# Patient Record
Sex: FEMALE | Race: OTHER | Employment: UNEMPLOYED | URBAN - METROPOLITAN AREA
[De-identification: names, ages, dates, MRNs, and addresses within clinical notes are randomized per-mention and may not be internally consistent; named-entity substitution may affect disease eponyms.]

---
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
## 2019-01-17 ENCOUNTER — HOSPITAL ENCOUNTER (OUTPATIENT)
Dept: MAMMOGRAPHY | Age: 19
Discharge: HOME | End: 2019-01-17
Attending: NURSE PRACTITIONER

## 2019-01-17 ENCOUNTER — OFFICE VISIT (OUTPATIENT)
Dept: FAMILY PLANNING/WOMEN'S HEALTH CLINIC | Age: 19
End: 2019-01-17

## 2019-01-17 VITALS — DIASTOLIC BLOOD PRESSURE: 62 MMHG | SYSTOLIC BLOOD PRESSURE: 99 MMHG

## 2019-01-17 DIAGNOSIS — N63.20 BREAST MASS, LEFT: ICD-10-CM

## 2019-01-17 DIAGNOSIS — N63.20 BREAST MASS, LEFT: Primary | ICD-10-CM

## 2019-01-17 PROCEDURE — 76642 ULTRASOUND BREAST LIMITED: CPT

## 2019-01-17 NOTE — PROGRESS NOTES
EVERY WOMANS LIFE HISTORY QUESTIONNAIRE       No Yes Comments   Has a doctor ever seen or felt anything wrong with your breast? []                                  [x]                                  Lumps on both   Have you ever had a breast biopsy? [x]                                  []                                          When and where was last mammogram performed? Never had one    Have you ever been told that there was a problem on your mammogram?   No Yes Comments   []                                  []                                  n/a     Do you have breast implants? No Yes Comments   [x]                                  []                                       When was your last Pap test performed? 2016    Have you ever been diagnosed with any type of Cancer   No Yes Comments (type,when,where,type of treatment   [x]                                  []                                          Has a family member been diagnosed with breast or ovarian cancer? No Yes Comments (which family members, and type   [x]                                  []                                         Have you been through menopause? No Yes Date of LMP   [x]                                  []                                       Are you taking hormone replacement therapy (HRT)     No Yes Comments   [x]                                  []                                       How many times have you been pregnant?     0      Number of live births ? 0    Are you experiencing any of the following? No Yes Comments   Nipple Discharge [x]                                  []                                     Breast Lump/Masses []                                  [x]                                     Breast Skin Changes [x]                                  []                                         Any other health problems?  none      List of Pap Providers given to pt?           HISTORY OF PRESENT Deidre Grover is a 22 y.o. female. HPI   18yoF G0 here for EWL. Miss Aleks Cervantes found a left breast lump in 2016 and was told \"it was nothing\" but to monitor it. For the past month the lump has been painful. She used to have lumps in her right breast but they have disappeared. She had her Implanon recently removed, 12/17/2018. LMP 01/07/2019, denies pregnancy. No FHX of breast or ovarian cancer. Review of Systems   Constitutional: Negative for diaphoresis, malaise/fatigue and weight loss. Physical Exam   Constitutional: She is oriented to person, place, and time. She appears well-developed and well-nourished. Pulmonary/Chest: Right breast exhibits no inverted nipple, no mass, no nipple discharge, no skin change and no tenderness. Left breast exhibits mass. Left breast exhibits no inverted nipple, no nipple discharge, no skin change and no tenderness. Breasts are symmetrical.       Lymphadenopathy:     She has no axillary adenopathy. Right: No supraclavicular adenopathy present. Left: No supraclavicular adenopathy present. Neurological: She is alert and oriented to person, place, and time. Skin: Skin is warm and dry. Psychiatric: She has a normal mood and affect. Nursing note and vitals reviewed. ASSESSMENT and PLAN  1. EWL   2. CBE      -left breast mass x 3  3. Diagnostic US today  4.  Possible referral to [facility] d/t increased pain/discomfort

## 2019-01-22 ENCOUNTER — DOCUMENTATION ONLY (OUTPATIENT)
Dept: FAMILY PLANNING/WOMEN'S HEALTH CLINIC | Age: 19
End: 2019-01-22

## 2019-08-19 ENCOUNTER — TELEPHONE (OUTPATIENT)
Dept: FAMILY PLANNING/WOMEN'S HEALTH CLINIC | Age: 19
End: 2019-08-19

## 2019-08-19 NOTE — TELEPHONE ENCOUNTER
Left VM for pt via [employer]  to call our office. She needs to schedule a f/u breast US that was due in July and there was no response to a reminder letter we sent her.

## 2019-08-28 ENCOUNTER — TELEPHONE (OUTPATIENT)
Dept: FAMILY PLANNING/WOMEN'S HEALTH CLINIC | Age: 19
End: 2019-08-28

## 2019-08-28 NOTE — TELEPHONE ENCOUNTER
Finally got hold of patient to remind her she was due for a f/u breast US in July. We will get an appt scheduled for her at [facility] and call her back with date/time.

## 2019-08-29 ENCOUNTER — TELEPHONE (OUTPATIENT)
Dept: FAMILY PLANNING/WOMEN'S HEALTH CLINIC | Age: 19
End: 2019-08-29

## 2019-09-04 ENCOUNTER — HOSPITAL ENCOUNTER (OUTPATIENT)
Dept: MAMMOGRAPHY | Age: 19
Discharge: HOME | End: 2019-09-04
Attending: NURSE PRACTITIONER

## 2019-09-04 DIAGNOSIS — R93.89 ABNORMAL ULTRASOUND: ICD-10-CM

## 2019-09-04 PROCEDURE — 76642 ULTRASOUND BREAST LIMITED: CPT

## 2019-09-04 NOTE — PROGRESS NOTES
I spoke with Nino Lopez at [facility] and relayed breast biopsy recommendation.   She will contact patient about biopsy appointment

## 2019-09-04 NOTE — PROGRESS NOTES
Please refer Ms. Jorge Meza to DR [facility]ESSICA RUI[facility] for biopsy. Re: BI-RADS 4A, left breast mass.

## 2019-10-14 ENCOUNTER — HOSPITAL ENCOUNTER (OUTPATIENT)
Dept: LAB | Age: 19
Discharge: HOME | End: 2019-10-14

## 2019-10-14 ENCOUNTER — OFFICE VISIT (OUTPATIENT)
Dept: SURGERY | Age: 19
End: 2019-10-14

## 2019-10-14 VITALS
BODY MASS INDEX: 20.06 KG/M2 | HEART RATE: 66 BPM | SYSTOLIC BLOOD PRESSURE: 101 MMHG | DIASTOLIC BLOOD PRESSURE: 57 MMHG | WEIGHT: 124.8 LBS | HEIGHT: 66 IN

## 2019-10-14 DIAGNOSIS — N63.20 LEFT BREAST MASS: Primary | ICD-10-CM

## 2019-10-14 DIAGNOSIS — R92.8 ABNORMAL ULTRASOUND OF BREAST: ICD-10-CM

## 2019-10-14 DIAGNOSIS — N63.10 BREAST MASS, RIGHT: ICD-10-CM

## 2019-10-14 NOTE — PATIENT INSTRUCTIONS

## 2019-10-14 NOTE — LETTER
10/29/2019 9:59 AM 
 
Patient:  Conor Puente YOB: 2000 Date of Visit: 10/14/2019 Dear Jenn Cohen NP 
[facility] [address] [facility] [id number] VIA In Basket 
 : Thank you for referring Ms. Conor Puente to me for evaluation/treatment. Below are the relevant portions of my assessment and plan of care. HISTORY OF PRESENT ILLNESS Conor Puente is a 20 y.o. female. HPI NEW patient consult referred by [facility] for LEFT breast mass. She has felt a LEFT breast lump for about a year and feels it may be slightly larger, tender with palpation or when lying on her side. Denies any nipple discharge/inversion or skin changes. OB History Obstetric Comments Menarche 14, LMP 10/12/19, # of children none, age of 1st delivery n/a, Hysterectomy/oophorectomy no/no, Breast bx no, history of breast feeding n/a, BCP no, Hormone therapy no Family History: No family history of breast cancer. MGM  from a gyn cancer, possibly cervical. 
 
 
19 LEFT breast U/S, BIRADS 4aFINDINGS: Left breast ultrasound was performed of the region of palpable 
concern, the 1:00 location, 4 cm from the nipple. There is an oval circumscribed 
parallel hypoechoic mass with internal vascularity, representing a solid mass. It has increased in size since the prior study. It now measures 3.4 x 2.8 x 1.1 
cm, previously 2.5 x 2.4 x 1 cm. 
  
IMPRESSION IMPRESSION: 
  
1. BI-RADS Assessment Category 4A: Suspicious abnormality - Biopsy should be 
performed in the absence of clinical contraindication. The solid left breast 
mass has increased in size. For this reason, ultrasound-guided core needle 
biopsy is recommended. It is still likely to be a fibroadenoma. 
  
The patient has been notified of these results and recommendations.  After 
consultation with the office of the referring physician, we will assist in 
 setting up a biopsy appointment. 1/17/19 LEFT breast U/S BIRADS 3 Review of Systems Constitutional: Positive for weight loss. All other systems reviewed and are negative. Physical Exam  
Constitutional: She is oriented to person, place, and time. She appears well-developed and well-nourished. HENT:  
Head: Normocephalic. Eyes: EOM are normal.  
Neck: Neck supple. No thyromegaly present. Cardiovascular: Intact distal pulses. Pulmonary/Chest: Effort normal. Right breast exhibits mass (11:00 1 x 1 cm mass firm mobile). Right breast exhibits no inverted nipple, no nipple discharge, no skin change and no tenderness. Left breast exhibits mass (1:00 2 x 3 cm mass). Left breast exhibits no inverted nipple, no nipple discharge, no skin change and no tenderness. Breasts are symmetrical.  
 
 
Abdominal: Soft. Musculoskeletal: Normal range of motion. Lymphadenopathy:  
  She has no cervical adenopathy. She has no axillary adenopathy. Neurological: She is alert and oriented to person, place, and time. Skin: Skin is warm and dry. Psychiatric: She has a normal mood and affect. Nursing note and vitals reviewed. BREAST ULTRASOUND Indication: Bilateral breast mass right breast 1:00 left breast 11:00 Technique: The area was scanned using a high-frequency linear-array near-field transducer Findings: Left: 3.4 x 2.8 x 1.1 cm mass regular hypoechoic Right breast 1 x 1 cm mass regular hypoechoic Impression: bilateral masses Disposition: Ultrasound-guided biopsy performed on both US - Guided Core Biopsy Indication : Palpable mass Bilateral  breast Left 1:00 Right 11:00 Ultrasound Findings:  See above Prep : alcohol. Anesthesia : 1% lidocaine with epinephrine, 10 cc. Device : The Gnip marquee device was advanced through the lesion and captured tissue with real-time Ultrasound Confirmation. Core Sampling :  3 cores were obtained from each mass Marker: hydromark Dressing : Steristrips, gauze and tape. Instructions : Remove gauze this evening. Remove steristrips in one week. Pathology : right: fibroadenoma Left: fibroadenoma Concordance:yes ASSESSMENT and PLAN 
  ICD-10-CM ICD-9-CM 1. Left breast mass N63.20 611.72 CANCELED: SURGICAL PATHOLOGY 2. Breast mass, right N63.10 611.72 CANCELED: SURGICAL PATHOLOGY 3. Abnormal ultrasound of breast R92.8 793.89 CANCELED: SURGICAL PATHOLOGY  
 
- bilateral fibroadenomas - f/u 6 months. Patient wants to observe. If you have questions, please do not hesitate to call me. I look forward to following Ms. Eliot Inman along with you. Sincerely, Ivette Rosenthal MD

## 2019-10-14 NOTE — PROGRESS NOTES
HISTORY OF PRESENT ILLNESS  Shan Soto is a 20 y.o. female. HPI NEW patient consult referred by [facility] for LEFT breast mass. She has felt a LEFT breast lump for about a year and feels it may be slightly larger, tender with palpation or when lying on her side. Denies any nipple discharge/inversion or skin changes. OB History   Obstetric Comments   Menarche 14, LMP 10/12/19, # of children none, age of 1st delivery n/a, Hysterectomy/oophorectomy no/no, Breast bx no, history of breast feeding n/a, BCP no, Hormone therapy no       Family History:  No family history of breast cancer. MGM  from a gyn cancer, possibly cervical.      19 LEFT breast U/S, BIRADS 4aFINDINGS: Left breast ultrasound was performed of the region of palpable  concern, the 1:00 location, 4 cm from the nipple. There is an oval circumscribed  parallel hypoechoic mass with internal vascularity, representing a solid mass. It has increased in size since the prior study. It now measures 3.4 x 2.8 x 1.1  cm, previously 2.5 x 2.4 x 1 cm.     IMPRESSION  IMPRESSION:     1. BI-RADS Assessment Category 4A: Suspicious abnormality - Biopsy should be  performed in the absence of clinical contraindication. The solid left breast  mass has increased in size. For this reason, ultrasound-guided core needle  biopsy is recommended. It is still likely to be a fibroadenoma.     The patient has been notified of these results and recommendations. After  consultation with the office of the referring physician, we will assist in  setting up a biopsy appointment. 19 LEFT breast U/S BIRADS 3    Review of Systems   Constitutional: Positive for weight loss. All other systems reviewed and are negative. Physical Exam   Constitutional: She is oriented to person, place, and time. She appears well-developed and well-nourished. HENT:   Head: Normocephalic. Eyes: EOM are normal.   Neck: Neck supple. No thyromegaly present. Cardiovascular: Intact distal pulses. Pulmonary/Chest: Effort normal. Right breast exhibits mass (11:00 1 x 1 cm mass firm mobile). Right breast exhibits no inverted nipple, no nipple discharge, no skin change and no tenderness. Left breast exhibits mass (1:00 2 x 3 cm mass). Left breast exhibits no inverted nipple, no nipple discharge, no skin change and no tenderness. Breasts are symmetrical.       Abdominal: Soft. Musculoskeletal: Normal range of motion. Lymphadenopathy:     She has no cervical adenopathy. She has no axillary adenopathy. Neurological: She is alert and oriented to person, place, and time. Skin: Skin is warm and dry. Psychiatric: She has a normal mood and affect. Nursing note and vitals reviewed. BREAST ULTRASOUND  Indication: Bilateral breast mass right breast 1:00 left breast 11:00   Technique: The area was scanned using a high-frequency linear-array near-field transducer  Findings: Left: 3.4 x 2.8 x 1.1 cm mass regular hypoechoic  Right breast 1 x 1 cm mass regular hypoechoic  Impression: bilateral masses  Disposition: Ultrasound-guided biopsy performed on both    US - Guided Core Biopsy  Indication : Palpable mass Bilateral  breast Left 1:00 Right 11:00    Ultrasound Findings:  See above   Prep : alcohol. Anesthesia : 1% lidocaine with epinephrine, 10 cc. Device : The [employer] Calend[employer] device was advanced through the lesion and captured tissue with real-time Ultrasound Confirmation. Core Sampling :  3 cores were obtained from each mass  Marker: hydromark   Dressing : Steristrips, gauze and tape. Instructions : Remove gauze this evening. Remove steristrips in one week. Pathology : right: fibroadenoma Left: fibroadenoma  Concordance:yes  ASSESSMENT and PLAN    ICD-10-CM ICD-9-CM    1. Left breast mass N63.20 611.72 CANCELED: SURGICAL PATHOLOGY   2. Breast mass, right N63.10 611.72 CANCELED: SURGICAL PATHOLOGY   3.  Abnormal ultrasound of breast R92.8 793.89 CANCELED: SURGICAL PATHOLOGY     - bilateral fibroadenomas  - f/u 6 months. Patient wants to observe.

## 2019-10-29 NOTE — COMMUNICATION BODY
HISTORY OF PRESENT ILLNESS  Robert Montejo is a 20 y.o. female. HPI NEW patient consult referred by [facility] for LEFT breast mass. She has felt a LEFT breast lump for about a year and feels it may be slightly larger, tender with palpation or when lying on her side. Denies any nipple discharge/inversion or skin changes. OB History   Obstetric Comments   Menarche 14, LMP 10/12/19, # of children none, age of 1st delivery n/a, Hysterectomy/oophorectomy no/no, Breast bx no, history of breast feeding n/a, BCP no, Hormone therapy no       Family History:  No family history of breast cancer. MGM  from a gyn cancer, possibly cervical.      19 LEFT breast U/S, BIRADS 4aFINDINGS: Left breast ultrasound was performed of the region of palpable  concern, the 1:00 location, 4 cm from the nipple. There is an oval circumscribed  parallel hypoechoic mass with internal vascularity, representing a solid mass. It has increased in size since the prior study. It now measures 3.4 x 2.8 x 1.1  cm, previously 2.5 x 2.4 x 1 cm.     IMPRESSION  IMPRESSION:     1. BI-RADS Assessment Category 4A: Suspicious abnormality - Biopsy should be  performed in the absence of clinical contraindication. The solid left breast  mass has increased in size. For this reason, ultrasound-guided core needle  biopsy is recommended. It is still likely to be a fibroadenoma.     The patient has been notified of these results and recommendations. After  consultation with the office of the referring physician, we will assist in  setting up a biopsy appointment. 19 LEFT breast U/S BIRADS 3    Review of Systems   Constitutional: Positive for weight loss. All other systems reviewed and are negative. Physical Exam   Constitutional: She is oriented to person, place, and time. She appears well-developed and well-nourished. HENT:   Head: Normocephalic. Eyes: EOM are normal.   Neck: Neck supple. No thyromegaly present. Cardiovascular: Intact distal pulses. Pulmonary/Chest: Effort normal. Right breast exhibits mass (11:00 1 x 1 cm mass firm mobile). Right breast exhibits no inverted nipple, no nipple discharge, no skin change and no tenderness. Left breast exhibits mass (1:00 2 x 3 cm mass). Left breast exhibits no inverted nipple, no nipple discharge, no skin change and no tenderness. Breasts are symmetrical.       Abdominal: Soft. Musculoskeletal: Normal range of motion. Lymphadenopathy:     She has no cervical adenopathy. She has no axillary adenopathy. Neurological: She is alert and oriented to person, place, and time. Skin: Skin is warm and dry. Psychiatric: She has a normal mood and affect. Nursing note and vitals reviewed. BREAST ULTRASOUND  Indication: Bilateral breast mass right breast 1:00 left breast 11:00   Technique: The area was scanned using a high-frequency linear-array near-field transducer  Findings: Left: 3.4 x 2.8 x 1.1 cm mass regular hypoechoic  Right breast 1 x 1 cm mass regular hypoechoic  Impression: bilateral masses  Disposition: Ultrasound-guided biopsy performed on both    US - Guided Core Biopsy  Indication : Palpable mass Bilateral  breast Left 1:00 Right 11:00    Ultrasound Findings:  See above   Prep : alcohol. Anesthesia : 1% lidocaine with epinephrine, 10 cc. Device : The Solid[employer]e device was advanced through the lesion and captured tissue with real-time Ultrasound Confirmation. Core Sampling :  3 cores were obtained from each mass  Marker: hydromark   Dressing : Steristrips, gauze and tape. Instructions : Remove gauze this evening. Remove steristrips in one week. Pathology : right: fibroadenoma Left: fibroadenoma  Concordance:yes  ASSESSMENT and PLAN    ICD-10-CM ICD-9-CM    1. Left breast mass N63.20 611.72 CANCELED: SURGICAL PATHOLOGY   2. Breast mass, right N63.10 611.72 CANCELED: SURGICAL PATHOLOGY   3.  Abnormal ultrasound of breast R92.8 793.89 CANCELED: SURGICAL PATHOLOGY     - bilateral fibroadenomas  - f/u 6 months. Patient wants to observe.

## 2020-07-13 ENCOUNTER — TELEPHONE (OUTPATIENT)
Dept: FAMILY PLANNING/WOMEN'S HEALTH CLINIC | Age: 20
End: 2020-07-13

## 2020-07-13 NOTE — TELEPHONE ENCOUNTER
Attempted to reach pt as she is overdue for a f/u appt with Dr. Rebecca Stephens at the DR [facility]. Phone just rang busy.

## 2020-07-15 ENCOUNTER — TELEPHONE (OUTPATIENT)
Dept: FAMILY PLANNING/WOMEN'S HEALTH CLINIC | Age: 20
End: 2020-07-15

## 2022-03-18 PROBLEM — K35.80 ACUTE APPENDICITIS: Status: ACTIVE | Noted: 2018-06-25

## 2022-03-19 PROBLEM — K62.5 RECTAL BLEEDING: Status: ACTIVE | Noted: 2018-01-09

## 2024-02-20 ENCOUNTER — INITIAL PRENATAL (OUTPATIENT)
Age: 24
End: 2024-02-20

## 2024-02-20 VITALS
SYSTOLIC BLOOD PRESSURE: 108 MMHG | HEIGHT: 66 IN | BODY MASS INDEX: 20.22 KG/M2 | WEIGHT: 125.8 LBS | DIASTOLIC BLOOD PRESSURE: 78 MMHG

## 2024-02-20 DIAGNOSIS — Z3A.01 LESS THAN 8 WEEKS GESTATION OF PREGNANCY: ICD-10-CM

## 2024-02-20 DIAGNOSIS — Z36.9 UNSPECIFIED ANTENATAL SCREENING: Primary | ICD-10-CM

## 2024-02-21 LAB
BACTERIA SPEC CULT: NORMAL
SERVICE CMNT-IMP: NORMAL

## 2024-02-25 LAB
C TRACH RRNA SPEC QL NAA+PROBE: NEGATIVE
N GONORRHOEA RRNA SPEC QL NAA+PROBE: NEGATIVE
SPECIMEN SOURCE: NORMAL
T VAGINALIS RRNA SPEC QL NAA+PROBE: NEGATIVE

## 2024-04-01 ENCOUNTER — ROUTINE PRENATAL (OUTPATIENT)
Age: 24
End: 2024-04-01

## 2024-04-01 VITALS — BODY MASS INDEX: 21.4 KG/M2 | WEIGHT: 132.6 LBS | SYSTOLIC BLOOD PRESSURE: 110 MMHG | DIASTOLIC BLOOD PRESSURE: 60 MMHG

## 2024-04-01 DIAGNOSIS — Z3A.01 LESS THAN 8 WEEKS GESTATION OF PREGNANCY: Primary | ICD-10-CM

## 2024-04-01 LAB
ABO + RH BLD: NORMAL
BLOOD BANK CMNT PATIENT-IMP: NORMAL
BLOOD GROUP ANTIBODIES SERPL: NORMAL
SPECIMEN EXP DATE BLD: NORMAL

## 2024-04-01 PROCEDURE — 0502F SUBSEQUENT PRENATAL CARE: CPT | Performed by: ADVANCED PRACTICE MIDWIFE

## 2024-04-01 RX ORDER — PNV NO.95/FERROUS FUM/FOLIC AC 28MG-0.8MG
1 TABLET ORAL DAILY
Qty: 30 TABLET | Refills: 5 | Status: SHIPPED | OUTPATIENT
Start: 2024-04-01

## 2024-04-02 LAB
ERYTHROCYTE [DISTWIDTH] IN BLOOD BY AUTOMATED COUNT: 12.9 % (ref 11.5–14.5)
HBV SURFACE AG SER QL: <0.1 INDEX
HBV SURFACE AG SER QL: NEGATIVE
HCT VFR BLD AUTO: 39.4 % (ref 35–47)
HCV AB SER IA-ACNC: <0.02 INDEX
HCV AB SERPL QL IA: NONREACTIVE
HGB BLD-MCNC: 13.5 G/DL (ref 11.5–16)
HIV 1+2 AB+HIV1 P24 AG SERPL QL IA: NONREACTIVE
HIV 1/2 RESULT COMMENT: NORMAL
MCH RBC QN AUTO: 31.3 PG (ref 26–34)
MCHC RBC AUTO-ENTMCNC: 34.3 G/DL (ref 30–36.5)
MCV RBC AUTO: 91.4 FL (ref 80–99)
NRBC # BLD: 0 K/UL (ref 0–0.01)
NRBC BLD-RTO: 0 PER 100 WBC
PLATELET # BLD AUTO: 212 K/UL (ref 150–400)
PMV BLD AUTO: 12.3 FL (ref 8.9–12.9)
RBC # BLD AUTO: 4.31 M/UL (ref 3.8–5.2)
RUBV IGG SERPL IA-ACNC: NORMAL IU/ML
T PALLIDUM AB SER QL IA: NON REACTIVE
VZV IGG SER IA-ACNC: 768 INDEX
WBC # BLD AUTO: 8.5 K/UL (ref 3.6–11)

## 2024-04-03 LAB
HGB A MFR BLD: 97.2 % (ref 96.4–98.8)
HGB A2 MFR BLD COLUMN CHROM: 2.8 % (ref 1.8–3.2)
HGB F MFR BLD: 0 % (ref 0–2)
HGB FRACT BLD-IMP: NORMAL
HGB S MFR BLD: 0 %

## 2024-04-30 ENCOUNTER — ROUTINE PRENATAL (OUTPATIENT)
Age: 24
End: 2024-04-30

## 2024-04-30 VITALS — SYSTOLIC BLOOD PRESSURE: 102 MMHG | DIASTOLIC BLOOD PRESSURE: 70 MMHG | BODY MASS INDEX: 22.27 KG/M2 | WEIGHT: 138 LBS

## 2024-04-30 DIAGNOSIS — Z3A.17 17 WEEKS GESTATION OF PREGNANCY: Primary | ICD-10-CM

## 2024-04-30 DIAGNOSIS — Z3A.01 LESS THAN 8 WEEKS GESTATION OF PREGNANCY: ICD-10-CM

## 2024-04-30 PROCEDURE — 0502F SUBSEQUENT PRENATAL CARE: CPT | Performed by: ADVANCED PRACTICE MIDWIFE

## 2024-04-30 NOTE — PROGRESS NOTES
Pt reports feeling flutters of movement. She c/o some cramping- denied spotting. She reports migraines with loss of appetite and vomiting.   Hasn't tried Tylenol yet, bought it but hasn't had a HA since buying it  Also recommend starting Magnesium daily and increasing water intake  AFP today  FAS next visit

## 2024-05-02 LAB
AFP INTERP SERPL-IMP: NORMAL
AFP MOM SERPL: 0.66
AFP SERPL-MCNC: 28.3 NG/ML
AGE AT DELIVERY: 25.2 YR
AGE OF EGG DONOR: NORMAL
AGE OF EGG DONOR: NORMAL
COMMENT: NORMAL
DONOR EGG?: NO
DONOR EGG?: NORMAL
FAMILY HISTORY NTD: NORMAL
FHX NTD (Y OR N): NO
GA METHOD: NORMAL
GA: 17.4 WEEKS
IDDM PATIENT QL: NO
INSULIN DEP. DIABETIC: 17.11
Lab: 138
Lab: NORMAL
Lab: NORMAL
MAT SCN FOR FETAL ABNORMALITIES SERPL: NORMAL
MULTIPLE PREGNANCY: NO
NEURAL TUBE DEFECT RISK FETUS: NORMAL
NUMBER OF FETUSES: NO
OTHER INDICATIONS: NO
OTHER INDICATIONS: NORMAL
PREVIOUSLY ELEVATED AFP (Y OR N): 17
PREVIOUSLY ELEVATED AFP (Y OR N): NO
PRIOR 1ST TRIM TESTING ?: NO
PRIOR 2ND TRIM TESTING ?: NO
PRIOR DS/NTD SCREEN CURRENT PREGNANCY?: NO
PRIOR DS/NTD SCREEN CURRENT PREGNANCY?: NORMAL
PRIOR FIRST TRIMESTER TESTING (Y OR N ): NORMAL
PRIOR PREGNANCY WITH DOWN SYNDROME (Y OR N): 1
PRIOR PREGNANCY WITH DOWN SYNDROME (Y OR N): NO
TYPE OF EGG DONOR: NORMAL
TYPE OF EGG DONOR: NORMAL

## 2024-05-30 ENCOUNTER — ROUTINE PRENATAL (OUTPATIENT)
Age: 24
End: 2024-05-30

## 2024-05-30 VITALS — DIASTOLIC BLOOD PRESSURE: 62 MMHG | SYSTOLIC BLOOD PRESSURE: 102 MMHG | WEIGHT: 150.4 LBS | BODY MASS INDEX: 24.28 KG/M2

## 2024-05-30 DIAGNOSIS — Z3A.01 LESS THAN 8 WEEKS GESTATION OF PREGNANCY: ICD-10-CM

## 2024-05-30 DIAGNOSIS — Z3A.21 21 WEEKS GESTATION OF PREGNANCY: Primary | ICD-10-CM

## 2024-05-30 DIAGNOSIS — M54.50 ACUTE LOW BACK PAIN WITHOUT SCIATICA, UNSPECIFIED BACK PAIN LATERALITY: ICD-10-CM

## 2024-05-30 PROCEDURE — 0502F SUBSEQUENT PRENATAL CARE: CPT

## 2024-05-30 NOTE — PROGRESS NOTES
KIRT at 21w5d.  Doing well. FM+, Denies LOF/VB/cxns.  US:  FETAL SURVEY A SINGLE VIABLE IUP AT 21W5D IS SEEN. FETAL CARDIAC MOTION OBSERVED. FETAL ANATOMY WAS WELL VISUALIZED AND APPEARS WNL. NO ABNORMALITIES WERE SEEN ON TODAYS EXAM. APPROPRIATE GROWTH MEASURED. SIZE = DATES. DESIRE, PLACENTA AND CERVIX APPEAR WITHIN NORMAL LIMITS. GENDER: MALE  Having some back and alignment pain. Chiro referral placed.  RTO 4 wks with gtt.  LORENA Monahan - TRAVIS

## 2024-06-27 ENCOUNTER — ROUTINE PRENATAL (OUTPATIENT)
Age: 24
End: 2024-06-27

## 2024-06-27 ENCOUNTER — NURSE ONLY (OUTPATIENT)
Age: 24
End: 2024-06-27

## 2024-06-27 VITALS — WEIGHT: 163.2 LBS | BODY MASS INDEX: 26.34 KG/M2 | DIASTOLIC BLOOD PRESSURE: 78 MMHG | SYSTOLIC BLOOD PRESSURE: 128 MMHG

## 2024-06-27 DIAGNOSIS — Z36.9 UNSPECIFIED ANTENATAL SCREENING: ICD-10-CM

## 2024-06-27 DIAGNOSIS — Z3A.01 LESS THAN 8 WEEKS GESTATION OF PREGNANCY: ICD-10-CM

## 2024-06-27 DIAGNOSIS — Z36.9 UNSPECIFIED ANTENATAL SCREENING: Primary | ICD-10-CM

## 2024-06-27 LAB
ABO + RH BLD: NORMAL
BLOOD BANK CMNT PATIENT-IMP: NORMAL
BLOOD GROUP ANTIBODIES SERPL: NORMAL
ERYTHROCYTE [DISTWIDTH] IN BLOOD BY AUTOMATED COUNT: 12.9 % (ref 11.5–14.5)
GLUCOSE 1H P 100 G GLC PO SERPL-MCNC: 112 MG/DL (ref 65–140)
HCT VFR BLD AUTO: 37.4 % (ref 35–47)
HGB BLD-MCNC: 12 G/DL (ref 11.5–16)
HIV 1+2 AB+HIV1 P24 AG SERPL QL IA: NONREACTIVE
HIV 1/2 RESULT COMMENT: NORMAL
MCH RBC QN AUTO: 30.5 PG (ref 26–34)
MCHC RBC AUTO-ENTMCNC: 32.1 G/DL (ref 30–36.5)
MCV RBC AUTO: 95.2 FL (ref 80–99)
NRBC # BLD: 0 K/UL (ref 0–0.01)
NRBC BLD-RTO: 0 PER 100 WBC
PLATELET # BLD AUTO: 253 K/UL (ref 150–400)
PMV BLD AUTO: 12 FL (ref 8.9–12.9)
RBC # BLD AUTO: 3.93 M/UL (ref 3.8–5.2)
SPECIMEN EXP DATE BLD: NORMAL
WBC # BLD AUTO: 7.5 K/UL (ref 3.6–11)

## 2024-06-27 PROCEDURE — 0502F SUBSEQUENT PRENATAL CARE: CPT

## 2024-06-27 RX ORDER — ERYTHROMYCIN 5 MG/G
OINTMENT OPHTHALMIC
Qty: 3.5 G | Refills: 0 | Status: SHIPPED | OUTPATIENT
Start: 2024-06-27 | End: 2024-07-07

## 2024-06-27 NOTE — PROGRESS NOTES
KIRT at 25w5d.  Doing well. FM+, denies LOF/VB/cxns.  Gtt, third tri labs today.  Pt having some anxiety about pregnancy and concerns for baby. She declines med management. [facility] info given. Pt going to work on setting up therapy support.  Patient has three styes. Encouraged keeping clean, warm compress, sending eyrthromycin ointment.  RTO 3 weeks.  Rohit Rfuf, LORENA - TRAVIS

## 2024-06-28 LAB — T PALLIDUM AB SER QL IA: NON REACTIVE

## 2024-07-12 ENCOUNTER — ROUTINE PRENATAL (OUTPATIENT)
Age: 24
End: 2024-07-12

## 2024-07-12 VITALS — BODY MASS INDEX: 27.44 KG/M2 | SYSTOLIC BLOOD PRESSURE: 124 MMHG | DIASTOLIC BLOOD PRESSURE: 82 MMHG | WEIGHT: 170 LBS

## 2024-07-12 DIAGNOSIS — Z3A.28 28 WEEKS GESTATION OF PREGNANCY: Primary | ICD-10-CM

## 2024-07-12 DIAGNOSIS — R30.9 URINARY PAIN: ICD-10-CM

## 2024-07-12 PROCEDURE — 0502F SUBSEQUENT PRENATAL CARE: CPT | Performed by: ADVANCED PRACTICE MIDWIFE

## 2024-07-12 NOTE — PROGRESS NOTES
+FM Pt c/o right side pain during urination along with frequent insomnia. + leuks in dip. Urine culture sent.

## 2024-07-12 NOTE — PROGRESS NOTES
KIRT:  Juliana CHU Hancock is a 23 y.o.  at 27w6d  who presents for routine OB appointment      Wt 77.1 kg (170 lb)   LMP 2023 (Approximate)   BMI 27.44 kg/m²   FHTs: 27  FH: 143    ABO: B POSITIVE     RhoGAM: N/A    GTT: 112  Subjective: Doing well, no complaints. Good FM. Denies vaginal bleeding, Leaking of fluid, abnormal cramping.    third trimester precautions given.   labor precautions reviewed.     Reviewed Tdap, Pt declines today.    Patient reports has some right lower side pain with urination, reports it is more when her bladder gets extremely full, but resolved by the time she is finished emptying her bladder.  Long dip positive for leukocytes no blood or nitrates or bacteria seen will send urine for culture  Discussed emptying her bladder more frequently  Patient reports insomnia had been taking melatonin but it makes her very groggy.  We discussed using the tablets of Unisom, or Benadryl to see if she has less grogginess with those  We discussed her overall weight gain of almost 50 pounds now, we discussed healthy eating style as well as portions, including proteins in meals and snacks    Follow up 2 weeks    LORENA Lucas CNM

## 2024-07-13 LAB
BACTERIA SPEC CULT: NORMAL
SERVICE CMNT-IMP: NORMAL

## 2024-07-26 ENCOUNTER — ROUTINE PRENATAL (OUTPATIENT)
Age: 24
End: 2024-07-26

## 2024-07-26 VITALS — BODY MASS INDEX: 27.89 KG/M2 | SYSTOLIC BLOOD PRESSURE: 118 MMHG | WEIGHT: 172.8 LBS | DIASTOLIC BLOOD PRESSURE: 80 MMHG

## 2024-07-26 DIAGNOSIS — Z3A.30 30 WEEKS GESTATION OF PREGNANCY: Primary | ICD-10-CM

## 2024-07-26 NOTE — PROGRESS NOTES
Doing well over all   Has some cold symptoms   Desires TDAP next visit   General malaise - has not taken a covid test, no fever just general cold symptoms  Recommend Vit C, Vit D, and zinc as well as rest   KIRT 2 weeks

## 2024-08-03 ENCOUNTER — TELEPHONE (OUTPATIENT)
Age: 24
End: 2024-08-03

## 2024-08-03 NOTE — TELEPHONE ENCOUNTER
Called pt due to my chart message. Pt noticing changes in fetal movement. Some decrease in intensity of movement though movement still present. Pt feeling baby move while on phone. Encouraged FKC. Of note, pt has anterior placenta. Reviewed that movement is movement and the characteristics of movement will change over time as baby changes. Pt is going to have a snack, some juice and do Fkc today. If she is not feeling 10 movements in 2 hours, she is going to call back and plan to come to ED. Denies LOF/VB/cxns.    Rohit Ruff, LORENA - TRAVIS

## 2024-08-09 ENCOUNTER — ROUTINE PRENATAL (OUTPATIENT)
Age: 24
End: 2024-08-09

## 2024-08-09 VITALS
HEIGHT: 66 IN | SYSTOLIC BLOOD PRESSURE: 128 MMHG | DIASTOLIC BLOOD PRESSURE: 80 MMHG | WEIGHT: 178.8 LBS | BODY MASS INDEX: 28.73 KG/M2

## 2024-08-09 DIAGNOSIS — Z3A.31 31 WEEKS GESTATION OF PREGNANCY: ICD-10-CM

## 2024-08-09 DIAGNOSIS — Z34.93 PRENATAL CARE IN THIRD TRIMESTER: ICD-10-CM

## 2024-08-09 DIAGNOSIS — L29.9 ITCHING: Primary | ICD-10-CM

## 2024-08-09 LAB
ALBUMIN SERPL-MCNC: 2.6 G/DL (ref 3.5–5)
ALBUMIN/GLOB SERPL: 0.7 (ref 1.1–2.2)
ALP SERPL-CCNC: 121 U/L (ref 45–117)
ALT SERPL-CCNC: 24 U/L (ref 12–78)
ANION GAP SERPL CALC-SCNC: 5 MMOL/L (ref 5–15)
AST SERPL-CCNC: 13 U/L (ref 15–37)
BILIRUB SERPL-MCNC: 0.4 MG/DL (ref 0.2–1)
BUN SERPL-MCNC: 8 MG/DL (ref 6–20)
BUN/CREAT SERPL: 16 (ref 12–20)
CALCIUM SERPL-MCNC: 9 MG/DL (ref 8.5–10.1)
CHLORIDE SERPL-SCNC: 107 MMOL/L (ref 97–108)
CO2 SERPL-SCNC: 24 MMOL/L (ref 21–32)
CREAT SERPL-MCNC: 0.5 MG/DL (ref 0.55–1.02)
ERYTHROCYTE [DISTWIDTH] IN BLOOD BY AUTOMATED COUNT: 13.5 % (ref 11.5–14.5)
GLOBULIN SER CALC-MCNC: 3.8 G/DL (ref 2–4)
GLUCOSE SERPL-MCNC: 76 MG/DL (ref 65–100)
HCT VFR BLD AUTO: 39.6 % (ref 35–47)
HGB BLD-MCNC: 12.9 G/DL (ref 11.5–16)
MCH RBC QN AUTO: 30.6 PG (ref 26–34)
MCHC RBC AUTO-ENTMCNC: 32.6 G/DL (ref 30–36.5)
MCV RBC AUTO: 94.1 FL (ref 80–99)
NRBC # BLD: 0 K/UL (ref 0–0.01)
NRBC BLD-RTO: 0 PER 100 WBC
PLATELET # BLD AUTO: 242 K/UL (ref 150–400)
PMV BLD AUTO: 11.7 FL (ref 8.9–12.9)
POTASSIUM SERPL-SCNC: 4.2 MMOL/L (ref 3.5–5.1)
PROT SERPL-MCNC: 6.4 G/DL (ref 6.4–8.2)
RBC # BLD AUTO: 4.21 M/UL (ref 3.8–5.2)
SODIUM SERPL-SCNC: 136 MMOL/L (ref 136–145)
WBC # BLD AUTO: 7.4 K/UL (ref 3.6–11)

## 2024-08-09 SDOH — ECONOMIC STABILITY: HOUSING INSECURITY
IN THE LAST 12 MONTHS, WAS THERE A TIME WHEN YOU DID NOT HAVE A STEADY PLACE TO SLEEP OR SLEPT IN A SHELTER (INCLUDING NOW)?: NO

## 2024-08-09 SDOH — ECONOMIC STABILITY: FOOD INSECURITY: WITHIN THE PAST 12 MONTHS, THE FOOD YOU BOUGHT JUST DIDN'T LAST AND YOU DIDN'T HAVE MONEY TO GET MORE.: NEVER TRUE

## 2024-08-09 SDOH — ECONOMIC STABILITY: FOOD INSECURITY: WITHIN THE PAST 12 MONTHS, YOU WORRIED THAT YOUR FOOD WOULD RUN OUT BEFORE YOU GOT MONEY TO BUY MORE.: NEVER TRUE

## 2024-08-09 SDOH — ECONOMIC STABILITY: INCOME INSECURITY: HOW HARD IS IT FOR YOU TO PAY FOR THE VERY BASICS LIKE FOOD, HOUSING, MEDICAL CARE, AND HEATING?: NOT HARD AT ALL

## 2024-08-09 ASSESSMENT — PATIENT HEALTH QUESTIONNAIRE - PHQ9
SUM OF ALL RESPONSES TO PHQ QUESTIONS 1-9: 1
SUM OF ALL RESPONSES TO PHQ9 QUESTIONS 1 & 2: 1
1. LITTLE INTEREST OR PLEASURE IN DOING THINGS: NOT AT ALL
2. FEELING DOWN, DEPRESSED OR HOPELESS: SEVERAL DAYS
SUM OF ALL RESPONSES TO PHQ QUESTIONS 1-9: 1

## 2024-08-09 NOTE — PROGRESS NOTES
KIRT:  Juliana CHU Hancock is a 24 y.o.  at 31w6d  who presents for routine OB appointment    No chief complaint on file.         LMP 2023 (Approximate)   FHTs: 150s  FH: 33    Tdap reviewed, Pt desires today.    ABO: B POSITIVE     RhoGAM: N/A     Subjective: Doing well, no complaints. Good FM. Denies vaginal bleeding, Leaking of fluid, regular contractions.    Patient reports itching, reports mostly on the tops of her feet legs hands, rarely on the soles and the palms of her hands will get labs today.  Having some right hip pain with radiating down her leg, discussed sciatica, discussed stretching belly band.  We talked about evening primrose while at 36 weeks Red leaf raspberry and dates can start now just 1 cup of red leaf raspberry a day.  Perineal stretching at 36 weeks    Third trimester precautions given.   labor precautions reviewed.     Follow up in 2 weeks.     LORENA Lucas CNM

## 2024-08-11 LAB — BILE AC SERPL-SCNC: 2.1 UMOL/L (ref 0–10)

## 2024-08-22 ENCOUNTER — ROUTINE PRENATAL (OUTPATIENT)
Age: 24
End: 2024-08-22

## 2024-08-22 VITALS — WEIGHT: 185 LBS | SYSTOLIC BLOOD PRESSURE: 128 MMHG | DIASTOLIC BLOOD PRESSURE: 82 MMHG | BODY MASS INDEX: 29.86 KG/M2

## 2024-08-22 DIAGNOSIS — Z3A.01 LESS THAN 8 WEEKS GESTATION OF PREGNANCY: ICD-10-CM

## 2024-08-22 DIAGNOSIS — H00.019 HORDEOLUM EXTERNUM, UNSPECIFIED LATERALITY: Primary | ICD-10-CM

## 2024-08-22 DIAGNOSIS — Z3A.33 33 WEEKS GESTATION OF PREGNANCY: ICD-10-CM

## 2024-08-22 PROCEDURE — 0502F SUBSEQUENT PRENATAL CARE: CPT

## 2024-08-22 RX ORDER — ACETAMINOPHEN 500 MG
500 TABLET ORAL EVERY 6 HOURS PRN
COMMUNITY

## 2024-08-22 RX ORDER — ASCORBIC ACID 500 MG
1000 TABLET ORAL DAILY
COMMUNITY

## 2024-08-22 NOTE — PROGRESS NOTES
Chief Complaint   Patient presents with    Routine Prenatal Visit     Pt has no abnormal bleeding,discharge nor odors        /82 (Site: Left Upper Arm, Position: Sitting, Cuff Size: Medium Adult)   Wt 83.9 kg (185 lb)   LMP 12/30/2023 (Approximate)   BMI 29.86 kg/m²     Pain Scale: 0 - No pain/10  Pain Location:      Current Outpatient Medications on File Prior to Visit   Medication Sig Dispense Refill    MAGNESIUM PO Take by mouth      vitamin C (ASCORBIC ACID) 500 MG tablet Take 2 tablets by mouth daily      acetaminophen (TYLENOL) 500 MG tablet Take 1 tablet by mouth every 6 hours as needed for Pain      Prenatal Vit-Fe Fumarate-FA (PRENATAL VITAMIN) 27-0.8 MG TABS Take 1 each by mouth daily 30 tablet 5     No current facility-administered medications on file prior to visit.       \"Have you been to the ER, urgent care clinic since your last visit?  Hospitalized since your last visit?\"    NO    “Have you seen or consulted any other health care providers outside of [facility] since your last visit?”    NO

## 2024-08-22 NOTE — PROGRESS NOTES
KIRT at 33w5d.  Doing well. FM+, denies LOF/VB/cxns.  Pt's styes have returned.   PCP and opthal referral placed, whomever can see her soon. They are becoming uncomfortable and impacting vision.   RTO 2-3 weeks.  Rohit Ruff, LORENA - TRAVIS

## 2024-09-08 ENCOUNTER — TELEPHONE (OUTPATIENT)
Facility: HOSPITAL | Age: 24
End: 2024-09-08

## 2024-09-09 ENCOUNTER — ROUTINE PRENATAL (OUTPATIENT)
Age: 24
End: 2024-09-09

## 2024-09-09 VITALS
HEIGHT: 66 IN | DIASTOLIC BLOOD PRESSURE: 76 MMHG | RESPIRATION RATE: 20 BRPM | TEMPERATURE: 98.7 F | HEART RATE: 91 BPM | WEIGHT: 190.5 LBS | OXYGEN SATURATION: 97 % | SYSTOLIC BLOOD PRESSURE: 113 MMHG | BODY MASS INDEX: 30.62 KG/M2

## 2024-09-09 DIAGNOSIS — Z36.9 UNSPECIFIED ANTENATAL SCREENING: Primary | ICD-10-CM

## 2024-09-09 DIAGNOSIS — Z3A.01 LESS THAN 8 WEEKS GESTATION OF PREGNANCY: ICD-10-CM

## 2024-09-09 PROCEDURE — 0502F SUBSEQUENT PRENATAL CARE: CPT | Performed by: MIDWIFE

## 2024-09-11 LAB
GP B STREP DNA SPEC QL NAA+PROBE: NEGATIVE
SPECIMEN SOURCE: NORMAL

## 2024-09-18 ENCOUNTER — ROUTINE PRENATAL (OUTPATIENT)
Age: 24
End: 2024-09-18

## 2024-09-18 VITALS
WEIGHT: 196.1 LBS | TEMPERATURE: 98.6 F | HEIGHT: 66 IN | BODY MASS INDEX: 31.52 KG/M2 | OXYGEN SATURATION: 97 % | RESPIRATION RATE: 20 BRPM | SYSTOLIC BLOOD PRESSURE: 109 MMHG | DIASTOLIC BLOOD PRESSURE: 71 MMHG | HEART RATE: 112 BPM

## 2024-09-18 DIAGNOSIS — Z3A.01 LESS THAN 8 WEEKS GESTATION OF PREGNANCY: ICD-10-CM

## 2024-09-18 DIAGNOSIS — Z3A.37 37 WEEKS GESTATION OF PREGNANCY: Primary | ICD-10-CM

## 2024-09-18 PROCEDURE — 0502F SUBSEQUENT PRENATAL CARE: CPT

## 2024-09-24 ENCOUNTER — ROUTINE PRENATAL (OUTPATIENT)
Age: 24
End: 2024-09-24

## 2024-09-24 VITALS
TEMPERATURE: 32.7 F | DIASTOLIC BLOOD PRESSURE: 80 MMHG | BODY MASS INDEX: 31.92 KG/M2 | HEIGHT: 66 IN | HEART RATE: 92 BPM | SYSTOLIC BLOOD PRESSURE: 117 MMHG | OXYGEN SATURATION: 98 % | RESPIRATION RATE: 18 BRPM | WEIGHT: 198.6 LBS

## 2024-09-24 DIAGNOSIS — Z3A.38 38 WEEKS GESTATION OF PREGNANCY: Primary | ICD-10-CM

## 2024-09-24 DIAGNOSIS — Z34.93 PRENATAL CARE IN THIRD TRIMESTER: ICD-10-CM

## 2024-09-24 PROCEDURE — 0502F SUBSEQUENT PRENATAL CARE: CPT | Performed by: ADVANCED PRACTICE MIDWIFE

## 2024-09-27 ENCOUNTER — TELEPHONE (OUTPATIENT)
Age: 24
End: 2024-09-27

## 2024-10-02 ENCOUNTER — ROUTINE PRENATAL (OUTPATIENT)
Age: 24
End: 2024-10-02

## 2024-10-02 VITALS
BODY MASS INDEX: 32.33 KG/M2 | HEIGHT: 66 IN | WEIGHT: 201.2 LBS | OXYGEN SATURATION: 96 % | DIASTOLIC BLOOD PRESSURE: 74 MMHG | SYSTOLIC BLOOD PRESSURE: 120 MMHG | HEART RATE: 104 BPM | RESPIRATION RATE: 20 BRPM | TEMPERATURE: 98.3 F

## 2024-10-02 DIAGNOSIS — Z3A.39 39 WEEKS GESTATION OF PREGNANCY: Primary | ICD-10-CM

## 2024-10-02 PROCEDURE — 0502F SUBSEQUENT PRENATAL CARE: CPT

## 2024-10-02 NOTE — PROGRESS NOTES
KIRT at 39w4d.  Doing well. FM+, denies LOF/VB/cxns.   CE posterior, fetal head well applied. Confirmed by vscan vertex.  Reviewed SOL and warning s/s.  Reviewed fkc.  RTO 1 wk with US.    Rohit Ruff, LORENA - TRAVIS

## 2024-10-09 DIAGNOSIS — Z3A.40 40 WEEKS GESTATION OF PREGNANCY: Primary | ICD-10-CM

## 2024-10-10 ENCOUNTER — HOSPITAL ENCOUNTER (INPATIENT)
Facility: HOSPITAL | Age: 24
LOS: 2 days | Discharge: HOME | End: 2024-10-12
Attending: OBSTETRICS & GYNECOLOGY | Admitting: OBSTETRICS & GYNECOLOGY
Payer: COMMERCIAL

## 2024-10-10 ENCOUNTER — TELEPHONE (OUTPATIENT)
Age: 24
End: 2024-10-10

## 2024-10-10 PROBLEM — O26.899 ABDOMINAL PAIN AFFECTING PREGNANCY: Status: ACTIVE | Noted: 2024-10-10

## 2024-10-10 PROBLEM — R10.9 ABDOMINAL PAIN AFFECTING PREGNANCY: Status: ACTIVE | Noted: 2024-10-10

## 2024-10-10 LAB
ABO + RH BLD: NORMAL
BLOOD GROUP ANTIBODIES SERPL: NORMAL
ERYTHROCYTE [DISTWIDTH] IN BLOOD BY AUTOMATED COUNT: 14.2 % (ref 11.5–14.5)
HCT VFR BLD AUTO: 42.9 % (ref 35–47)
HGB BLD-MCNC: 14.6 G/DL (ref 11.5–16)
MCH RBC QN AUTO: 31 PG (ref 26–34)
MCHC RBC AUTO-ENTMCNC: 34 G/DL (ref 30–36.5)
MCV RBC AUTO: 91.1 FL (ref 80–99)
NRBC # BLD: 0 K/UL (ref 0–0.01)
NRBC BLD-RTO: 0 PER 100 WBC
PLATELET # BLD AUTO: 230 K/UL (ref 150–400)
PMV BLD AUTO: 12.4 FL (ref 8.9–12.9)
RBC # BLD AUTO: 4.71 M/UL (ref 3.8–5.2)
RPR SER QL: NONREACTIVE
SPECIMEN EXP DATE BLD: NORMAL
WBC # BLD AUTO: 11 K/UL (ref 3.6–11)

## 2024-10-10 PROCEDURE — 10D17Z9 MANUAL EXTRACTION OF PRODUCTS OF CONCEPTION, RETAINED, VIA NATURAL OR ARTIFICIAL OPENING: ICD-10-PCS | Performed by: OBSTETRICS & GYNECOLOGY

## 2024-10-10 PROCEDURE — APPNB60 APP NON BILLABLE TIME 46-60 MINS: Performed by: MIDWIFE

## 2024-10-10 PROCEDURE — 6370000000 HC RX 637 (ALT 250 FOR IP): Performed by: MIDWIFE

## 2024-10-10 PROCEDURE — 0KQM0ZZ REPAIR PERINEUM MUSCLE, OPEN APPROACH: ICD-10-PCS | Performed by: OBSTETRICS & GYNECOLOGY

## 2024-10-10 PROCEDURE — 86592 SYPHILIS TEST NON-TREP QUAL: CPT

## 2024-10-10 PROCEDURE — 6370000000 HC RX 637 (ALT 250 FOR IP): Performed by: OBSTETRICS & GYNECOLOGY

## 2024-10-10 PROCEDURE — 4500000002 HC ER NO CHARGE

## 2024-10-10 PROCEDURE — 86901 BLOOD TYPING SEROLOGIC RH(D): CPT

## 2024-10-10 PROCEDURE — 7100000001 HC PACU RECOVERY - ADDTL 15 MIN

## 2024-10-10 PROCEDURE — 59400 OBSTETRICAL CARE: CPT | Performed by: MIDWIFE

## 2024-10-10 PROCEDURE — 99283 EMERGENCY DEPT VISIT LOW MDM: CPT

## 2024-10-10 PROCEDURE — 7210000100 HC LABOR FEE PER 1 HR

## 2024-10-10 PROCEDURE — 36415 COLL VENOUS BLD VENIPUNCTURE: CPT

## 2024-10-10 PROCEDURE — 7100000000 HC PACU RECOVERY - FIRST 15 MIN

## 2024-10-10 PROCEDURE — 1120000000 HC RM PRIVATE OB

## 2024-10-10 PROCEDURE — 86900 BLOOD TYPING SEROLOGIC ABO: CPT

## 2024-10-10 PROCEDURE — 86850 RBC ANTIBODY SCREEN: CPT

## 2024-10-10 PROCEDURE — APPNB30 APP NON BILLABLE TIME 0-30 MINS: Performed by: MIDWIFE

## 2024-10-10 PROCEDURE — 85027 COMPLETE CBC AUTOMATED: CPT

## 2024-10-10 PROCEDURE — 7220000101 HC DELIVERY VAGINAL/SINGLE

## 2024-10-10 PROCEDURE — 6360000002 HC RX W HCPCS: Performed by: MIDWIFE

## 2024-10-10 PROCEDURE — 4A1HXCZ MONITORING OF PRODUCTS OF CONCEPTION, CARDIAC RATE, EXTERNAL APPROACH: ICD-10-PCS | Performed by: OBSTETRICS & GYNECOLOGY

## 2024-10-10 RX ORDER — ONDANSETRON 2 MG/ML
4 INJECTION INTRAMUSCULAR; INTRAVENOUS EVERY 6 HOURS PRN
Status: DISCONTINUED | OUTPATIENT
Start: 2024-10-10 | End: 2024-10-10

## 2024-10-10 RX ORDER — SODIUM CHLORIDE, SODIUM LACTATE, POTASSIUM CHLORIDE, CALCIUM CHLORIDE 600; 310; 30; 20 MG/100ML; MG/100ML; MG/100ML; MG/100ML
INJECTION, SOLUTION INTRAVENOUS CONTINUOUS
Status: DISCONTINUED | OUTPATIENT
Start: 2024-10-10 | End: 2024-10-12 | Stop reason: HOSPADM

## 2024-10-10 RX ORDER — MISOPROSTOL 200 UG/1
400 TABLET ORAL PRN
Status: DISCONTINUED | OUTPATIENT
Start: 2024-10-10 | End: 2024-10-12 | Stop reason: HOSPADM

## 2024-10-10 RX ORDER — SODIUM CHLORIDE 0.9 % (FLUSH) 0.9 %
5-40 SYRINGE (ML) INJECTION EVERY 12 HOURS SCHEDULED
Status: DISCONTINUED | OUTPATIENT
Start: 2024-10-10 | End: 2024-10-12 | Stop reason: HOSPADM

## 2024-10-10 RX ORDER — TERBUTALINE SULFATE 1 MG/ML
0.25 INJECTION, SOLUTION SUBCUTANEOUS
Status: ACTIVE | OUTPATIENT
Start: 2024-10-10 | End: 2024-10-11

## 2024-10-10 RX ORDER — SODIUM CHLORIDE 9 MG/ML
25 INJECTION, SOLUTION INTRAVENOUS PRN
Status: DISCONTINUED | OUTPATIENT
Start: 2024-10-10 | End: 2024-10-12 | Stop reason: HOSPADM

## 2024-10-10 RX ORDER — CARBOPROST TROMETHAMINE 250 UG/ML
250 INJECTION, SOLUTION INTRAMUSCULAR PRN
Status: DISCONTINUED | OUTPATIENT
Start: 2024-10-10 | End: 2024-10-12 | Stop reason: HOSPADM

## 2024-10-10 RX ORDER — HYDROMORPHONE HYDROCHLORIDE 1 MG/ML
1 INJECTION, SOLUTION INTRAMUSCULAR; INTRAVENOUS; SUBCUTANEOUS EVERY 4 HOURS PRN
Status: DISCONTINUED | OUTPATIENT
Start: 2024-10-10 | End: 2024-10-12 | Stop reason: HOSPADM

## 2024-10-10 RX ORDER — ONDANSETRON 2 MG/ML
4 INJECTION INTRAMUSCULAR; INTRAVENOUS EVERY 6 HOURS PRN
Status: DISCONTINUED | OUTPATIENT
Start: 2024-10-10 | End: 2024-10-12 | Stop reason: HOSPADM

## 2024-10-10 RX ORDER — SODIUM CHLORIDE 9 MG/ML
INJECTION, SOLUTION INTRAVENOUS PRN
Status: DISCONTINUED | OUTPATIENT
Start: 2024-10-10 | End: 2024-10-12 | Stop reason: HOSPADM

## 2024-10-10 RX ORDER — SODIUM CHLORIDE 0.9 % (FLUSH) 0.9 %
5-40 SYRINGE (ML) INJECTION PRN
Status: DISCONTINUED | OUTPATIENT
Start: 2024-10-10 | End: 2024-10-12 | Stop reason: HOSPADM

## 2024-10-10 RX ORDER — ACETAMINOPHEN 325 MG/1
650 TABLET ORAL EVERY 4 HOURS PRN
Status: DISCONTINUED | OUTPATIENT
Start: 2024-10-10 | End: 2024-10-12 | Stop reason: HOSPADM

## 2024-10-10 RX ORDER — ACETAMINOPHEN 500 MG
1000 TABLET ORAL EVERY 8 HOURS SCHEDULED
Status: DISCONTINUED | OUTPATIENT
Start: 2024-10-10 | End: 2024-10-12 | Stop reason: HOSPADM

## 2024-10-10 RX ORDER — ONDANSETRON 4 MG/1
4 TABLET, ORALLY DISINTEGRATING ORAL EVERY 6 HOURS PRN
Status: DISCONTINUED | OUTPATIENT
Start: 2024-10-10 | End: 2024-10-10

## 2024-10-10 RX ORDER — IBUPROFEN 400 MG/1
800 TABLET, FILM COATED ORAL EVERY 8 HOURS SCHEDULED
Status: DISCONTINUED | OUTPATIENT
Start: 2024-10-11 | End: 2024-10-10

## 2024-10-10 RX ORDER — LIDOCAINE HYDROCHLORIDE 10 MG/ML
INJECTION, SOLUTION INFILTRATION; PERINEURAL
Status: DISPENSED
Start: 2024-10-10 | End: 2024-10-11

## 2024-10-10 RX ORDER — OXYTOCIN 10 [USP'U]/ML
10 INJECTION, SOLUTION INTRAMUSCULAR; INTRAVENOUS ONCE
Status: COMPLETED | OUTPATIENT
Start: 2024-10-10 | End: 2024-10-10

## 2024-10-10 RX ORDER — METHYLERGONOVINE MALEATE 0.2 MG/ML
200 INJECTION INTRAVENOUS PRN
Status: DISCONTINUED | OUTPATIENT
Start: 2024-10-10 | End: 2024-10-12 | Stop reason: HOSPADM

## 2024-10-10 RX ORDER — MODIFIED LANOLIN
OINTMENT (GRAM) TOPICAL PRN
Status: DISCONTINUED | OUTPATIENT
Start: 2024-10-10 | End: 2024-10-12 | Stop reason: HOSPADM

## 2024-10-10 RX ORDER — DOCUSATE SODIUM 100 MG/1
100 CAPSULE, LIQUID FILLED ORAL 2 TIMES DAILY PRN
Status: DISCONTINUED | OUTPATIENT
Start: 2024-10-10 | End: 2024-10-12 | Stop reason: HOSPADM

## 2024-10-10 RX ORDER — ONDANSETRON 4 MG/1
4 TABLET, ORALLY DISINTEGRATING ORAL EVERY 6 HOURS PRN
Status: DISCONTINUED | OUTPATIENT
Start: 2024-10-10 | End: 2024-10-12 | Stop reason: HOSPADM

## 2024-10-10 RX ORDER — IBUPROFEN 400 MG/1
800 TABLET, FILM COATED ORAL EVERY 8 HOURS
Status: DISCONTINUED | OUTPATIENT
Start: 2024-10-10 | End: 2024-10-12 | Stop reason: HOSPADM

## 2024-10-10 RX ADMIN — OXYTOCIN 10 UNITS: 10 INJECTION INTRAVENOUS at 18:33

## 2024-10-10 RX ADMIN — IBUPROFEN 800 MG: 400 TABLET, FILM COATED ORAL at 18:15

## 2024-10-10 RX ADMIN — ACETAMINOPHEN 1000 MG: 500 TABLET ORAL at 21:24

## 2024-10-10 ASSESSMENT — ENCOUNTER SYMPTOMS
ALLERGIC/IMMUNOLOGIC NEGATIVE: 1
GASTROINTESTINAL NEGATIVE: 1
RESPIRATORY NEGATIVE: 1
EYES NEGATIVE: 1

## 2024-10-10 ASSESSMENT — PAIN SCALES - GENERAL
PAINLEVEL_OUTOF10: 6
PAINLEVEL_OUTOF10: 5

## 2024-10-10 ASSESSMENT — PAIN DESCRIPTION - DESCRIPTORS: DESCRIPTORS: CRAMPING

## 2024-10-10 ASSESSMENT — PAIN DESCRIPTION - ORIENTATION: ORIENTATION: LOWER

## 2024-10-10 ASSESSMENT — PAIN DESCRIPTION - LOCATION
LOCATION: ABDOMEN;VAGINA
LOCATION: ABDOMEN

## 2024-10-10 NOTE — PROGRESS NOTES
Labor Progress Note  Patient seen, fetal heart rate and contraction pattern evaluated, patient examined.  /78   Pulse 87   Temp 98.7 °F (37.1 °C)   Resp 16   LMP 12/30/2023 (Approximate)   SpO2 99%     Physical Exam:  Cervical Exam:  8 cm dilated    100% effaced    0 station    Presenting Part: cephalic  Membranes:  Intact  Uterine Activity: Frequency: Every 2-4 minutes, Duration: 60 seconds, and Intensity: strong  Fetal Heart Rate: Reactive    Assessment/Plan:  Reassuring fetal status    Anticipate 2nd stage soon  Continue labor support

## 2024-10-10 NOTE — TELEPHONE ENCOUNTER
Patient called, name and  verified. Patient is calling c/o contractions that started @ 11:00 pm last night. She reports that are getting intense and are about 10 mins apart at the moment. She denies any LOF, and good FM. She was told by her midwife to call in if they got more intense. She has been placed on the schedule and told to come in. She is grabbing something to eat and will head in.    She has ALSO been informed that if her contractions gets closer to 3-5 mins apart be fore she can get to our office then she should report immediately to L&D. Pt has expressed understanding. She is currently a  and 40w5d. Age is 24.

## 2024-10-10 NOTE — L&D DELIVERY NOTE
#[id number]                Delivery Providers    Delivering clinician: Elaine Bermudez APRN - CNM     Provider Role     Obstetrician    Rhoda Lloyd, RN Primary Nurse    Linda Hardwick RN Primary  Nurse     NICU Nurse     Neonatologist     Anesthesiologist     Nurse Anesthetist     Nurse Practitioner    Elaine Bermudez APRN - CNM Midwife     Nursery Nurse     Respiratory Therapist     Scrub Tech     Assistant Surgeon              [place] Assessment    Living Status: Living  Delivery Location Comment: LDR 7        Skin Color:   Heart Rate:   Reflex Irritability:   Muscle Tone:   Respiratory Effort:   Total:            1 Minute:    1    2    2    2    2    9         5 Minute:    1    2    2    2    2    9                                        Apgars Assigned By: MAXINE HARDWICK RN              Resuscitation    Method: Bulb Suction, Room Air, Stimulation              Measurements               Skin to Skin      Skin to Skin Initiation Date/Time: 10/10/24 15:51:00 EDT     Skin to Skin With: Mother                Became Complete and +1, with strong urge to push. Pushed for about 45 minutes.  Live Male infant. Over intact perineum. head delivered, tight nuchal noted, delivered through.  Infant placed to maternal abdomen. Infant dried and stimulated, spontaneous cry. Cord allowed to cease pulsations, then doubly clamped and cut per fob. 3VC. Cordblood Obtained yes. Placenta and cord, spont [place], Intact. EBL TBD .All counts correct yes. To RR, Stable.    Second degree perineal tear repaired with 3-0vicryl to hemostasis, and approximation in usual fashion.

## 2024-10-10 NOTE — PROGRESS NOTES
1115 SBAR from NAWAF granda from patient arrived from [place].     1130 patient in room with . Nitrous in use. All questions answered at this time.     1215 . Listened before during and after contraction.   1400 patient in birthing tub    1426 patient back in birthing tub    1442 SVE +1. Called Elaine and notified     1429 Elaine, at bedside. SVE 8-9 cm    1500 Patients IV wet and had come out on own. Removed. Elaine okay with no IV access for now.     1508 patient in hands and knees on bed.     1517 elaine at bedside, patient feeling more pressure.     1521 SROM    1551  BOY!    1715 Patient up to bathroom. Voided large amount. Patient fundus firm at U post void. Small amount of bleeding noted.     1830 patient increased amount of bleeding  with fundal check. Elaine notified of bleeding increase. IM pit ordered 10 units.     1845 TRANSFER - OUT REPORT:    Verbal report given to ALMITA Barker RN on Juliana Hancock  being transferred to [facility] for routine progression of patient care       Report consisted of patient's Situation, Background, Assessment and   Recommendations(SBAR).     Information from the following report(s) Intake/Output, MAR, and Recent Results was reviewed with the receiving nurse.           Lines:       Opportunity for questions and clarification was provided.      Patient transported with:  Registered Nurse

## 2024-10-10 NOTE — H&P
History & Physical    Name: Juliana Hancock MRN: [id number]  SSN: [id number]    YOB: 2000  Age: 24 y.o.  Sex: female        Subjective:     Estimated Date of Delivery: 10/5/24  OB History          1    Para        Term                AB        Living             SAB        IAB        Ectopic        Molar        Multiple        Live Births                    Ms. Fausto Hancock is admitted with pregnancy at 40w5d for active labor. Prenatal course was normal. Please see prenatal records for details.    Patient Active Problem List    Diagnosis Date Noted    Abdominal pain affecting pregnancy 10/10/2024    Less than 8 weeks gestation of pregnancy 2024     *EPDS next visit. Having anxiety about pregnancy and baby's well being. Pt's mother had a child which passed away in infancy. Her mother conceived her while actively grieving this child--struggling to cope with this. Feels she's always been surrounded by the energy of that infant loss* [facility] info given* -feels okay, has not looked into [facility] but agrees to do this>>doing better, prepping for delivery.     **Struggling with multiple styes since becoming pregnant. Recommended hot compress and erythromycin eye ointment sent. If not better by next visit with Reina on , send to PCP--she had 3 large ones at her last visit*- Styes better on -LSC--returned and worsening as of , PCP and opthal referral given for assessment on --opthal managing with ointment and drops. Planning to drain postpartum.    -cholestasis workup, CMP unremarkable, bile acids normal-[facility] [place]    Preferred name: Juliana  Partner name: Franklyn    Provider: TRAVIS  Collaborative provider:  Collaborative appointment:  G 1P  EDC by LMP confirmed by 8 week US  Pertinents:    COVID + in pregnancy:  Covid vaccinated: declined  Baby ASA:    Teaching checklist  First Tri handout: bkr  Second Tri Handout:   Third Tri handout:  Stability: Unknown (8/9/2024)    Housing Stability Vital Sign     Unstable Housing in the Last Year: No     Family History   Problem Relation Age of Onset    Cancer Maternal Grandmother         possibly cervical cancer, ?gyn       No Known Allergies  Prior to Admission medications    Medication Sig Start Date End Date Taking? Authorizing Provider   MAGNESIUM PO Take by mouth    Cari Levine MD   vitamin C (ASCORBIC ACID) 500 MG tablet Take 2 tablets by mouth daily    Cari Levine MD   acetaminophen (TYLENOL) 500 MG tablet Take 1 tablet by mouth every 6 hours as needed for Pain    Cari Levine MD   Prenatal Vit-Fe Fumarate-FA (PRENATAL VITAMIN) 27-0.8 MG TABS Take 1 each by mouth daily 4/1/24   Keyana Osborne APRN - TRAVIS        Review of Systems: Pertinent items are noted in HPI.    Objective:     Vitals:  Vitals:    10/10/24 1019 10/10/24 1021 10/10/24 1217   BP: 134/89 134/89 119/78   Pulse: 90 90 87   Resp: 16  16   Temp: 98.7 °F (37.1 °C)  98.7 °F (37.1 °C)   TempSrc: Oral     SpO2: 99%          Physical Exam:  Abdomen: soft, nontender  Fundus: firm and non tender  Cervical Exam: 4 cm dilated    90% effaced    -1 station    Membranes:  Intact  Fetal Heart Rate: Reactive    Prenatal Labs:   No components found for: \"OBEXTABORH\", \"OBEXTABSCRN\", \"OBEXTRUBELLA\", \"OBEXTGRBS\", \"OBEXTHBSAG\", \"OBEXTHIV\", \"OBEXTRPR\", \"OBEXTGONORR\", \"OBEXTCHLAM\"     Assessment/Plan:     G1 @ 40.5  Spontaneous labor  GBS neg  Category 1 fht    Labor support  Intermittent monitoring  Plans to shower now and then would like dilaudid so she can rest  .    Signed By:  LORENA Bolivar CNM     October 10, 2024

## 2024-10-10 NOTE — ED TRIAGE NOTES
1020-arrived from home with complaints of contractions which started last night about 2300 but became more intense about 0500, denies leaking of fluid, reports active fetal movement    1026-notified DR Rush of pt's arrival/complaints    1033-Dr Rush at the bedside, SVE 4cm, plan to admit

## 2024-10-10 NOTE — ED PROVIDER NOTES
History & Physical    Name: Juliana Hancock MRN: [id number]  SSN: [id number]    YOB: 2000  Age: 24 y.o.  Sex: female      Subjective:     Reason for Admission:  Pregnancy and Contractions    History of Present Illness: Juliana Hancock is a 24 y.o.  female with an estimated gestational age of 40w5d with Estimated Date of Delivery: 10/5/24. Patient complains of moderate contractions for 1 days. Pregnancy has been complicated by  None .  Patient denies chest pain, fever, headache , nausea and vomiting, pelvic pressure, right upper quadrant pain  , shortness of breath, swelling, vaginal bleeding , vaginal leaking of fluid , and visual disturbances.    OB History          1    Para        Term                AB        Living             SAB        IAB        Ectopic        Molar        Multiple        Live Births                  Past Medical History:   Diagnosis Date    PUD (peptic ulcer disease)     states intestinal ulcer, not stomach     Past Surgical History:   Procedure Laterality Date    APPENDECTOMY      COLONOSCOPY N/A 1/10/2018    COLONOSCOPY performed by Gena Juan MD at [facility] ENDOSCOPY     Social History     Occupational History    Not on file   Tobacco Use    Smoking status: Former     Passive exposure: Past    Smokeless tobacco: Never    Tobacco comments:     Quit smoking: reports about 1 cigarete per day   Substance and Sexual Activity    Alcohol use: No    Drug use: No     Comment: former MJ    Sexual activity: Yes     Birth control/protection: Condom     Family History   Problem Relation Age of Onset    Cancer Maternal Grandmother         possibly cervical cancer, ?gyn       No Known Allergies  Prior to Admission medications    Medication Sig Start Date End Date Taking? Authorizing Provider   MAGNESIUM PO Take by mouth    Cari Levine MD   vitamin C (ASCORBIC ACID) 500 MG tablet Take 2 tablets by mouth daily    Cari Levine

## 2024-10-10 NOTE — H&P
Willis Rush MD  Physician  Obstetrics     ED Provider Notes     Signed     Date of Service: 10/10/2024 10:28 AM     Signed       Expand All Collapse All    History & Physical     Name: Juliana Hancock MRN: [id number]  SSN: [id number]    YOB: 2000  Age: 24 y.o.  Sex: female       Subjective:      Reason for Admission:  Pregnancy and Contractions     History of Present Illness: Juliana Hancock is a 24 y.o.  female with an estimated gestational age of 40w5d with Estimated Date of Delivery: 10/5/24. Patient complains of moderate contractions for 1 days. Pregnancy has been complicated by  None .  Patient denies chest pain, fever, headache , nausea and vomiting, pelvic pressure, right upper quadrant pain  , shortness of breath, swelling, vaginal bleeding , vaginal leaking of fluid , and visual disturbances.     OB History            1    Para        Term                AB        Living               SAB        IAB        Ectopic        Molar        Multiple        Live Births                    Past Medical History        Past Medical History:   Diagnosis Date    PUD (peptic ulcer disease)       states intestinal ulcer, not stomach         Past Surgical History         Past Surgical History:   Procedure Laterality Date    APPENDECTOMY        COLONOSCOPY N/A 1/10/2018     COLONOSCOPY performed by eGna Juan MD at [facility] ENDOSCOPY         Social History            Occupational History    Not on file   Tobacco Use    Smoking status: Former       Passive exposure: Past    Smokeless tobacco: Never    Tobacco comments:       Quit smoking: reports about 1 cigarete per day   Substance and Sexual Activity    Alcohol use: No    Drug use: No       Comment: former MJ    Sexual activity: Yes       Birth control/protection: Condom      Family History         Family History   Problem Relation Age of Onset    Cancer Maternal Grandmother           possibly cervical cancer,

## 2024-10-10 NOTE — LACTATION NOTE
This note was copied from a baby's chart.  Baby nursing well after delivery, deep latch obtained, mother is comfortable, baby feeding vigorously with rhythmic suck, swallow, breathe pattern, both breasts offered, baby is skin to skin for feeding.   Mother has easily expressed colostrum and was able to latch baby with assistance.

## 2024-10-11 PROCEDURE — 6370000000 HC RX 637 (ALT 250 FOR IP): Performed by: MIDWIFE

## 2024-10-11 PROCEDURE — 6370000000 HC RX 637 (ALT 250 FOR IP): Performed by: OBSTETRICS & GYNECOLOGY

## 2024-10-11 PROCEDURE — 1120000000 HC RM PRIVATE OB

## 2024-10-11 PROCEDURE — APPNB30 APP NON BILLABLE TIME 0-30 MINS: Performed by: MIDWIFE

## 2024-10-11 RX ADMIN — ACETAMINOPHEN 1000 MG: 500 TABLET ORAL at 05:43

## 2024-10-11 RX ADMIN — IBUPROFEN 800 MG: 400 TABLET, FILM COATED ORAL at 02:11

## 2024-10-11 RX ADMIN — DOCUSATE SODIUM 100 MG: 100 CAPSULE, LIQUID FILLED ORAL at 23:22

## 2024-10-11 RX ADMIN — IBUPROFEN 800 MG: 400 TABLET, FILM COATED ORAL at 11:04

## 2024-10-11 RX ADMIN — ACETAMINOPHEN 1000 MG: 500 TABLET ORAL at 15:02

## 2024-10-11 RX ADMIN — IBUPROFEN 800 MG: 400 TABLET, FILM COATED ORAL at 18:39

## 2024-10-11 RX ADMIN — ACETAMINOPHEN 1000 MG: 500 TABLET ORAL at 23:22

## 2024-10-11 RX ADMIN — DOCUSATE SODIUM 100 MG: 100 CAPSULE, LIQUID FILLED ORAL at 11:04

## 2024-10-11 ASSESSMENT — PAIN - FUNCTIONAL ASSESSMENT
PAIN_FUNCTIONAL_ASSESSMENT: ACTIVITIES ARE NOT PREVENTED
PAIN_FUNCTIONAL_ASSESSMENT: ACTIVITIES ARE NOT PREVENTED

## 2024-10-11 ASSESSMENT — PAIN SCALES - GENERAL
PAINLEVEL_OUTOF10: 5
PAINLEVEL_OUTOF10: 4
PAINLEVEL_OUTOF10: 3

## 2024-10-11 ASSESSMENT — PAIN DESCRIPTION - DESCRIPTORS
DESCRIPTORS: SORE
DESCRIPTORS: ACHING
DESCRIPTORS: CRAMPING;SORE
DESCRIPTORS: SORE

## 2024-10-11 ASSESSMENT — PAIN DESCRIPTION - ORIENTATION: ORIENTATION: LOWER

## 2024-10-11 ASSESSMENT — PAIN DESCRIPTION - LOCATION
LOCATION: ABDOMEN;PERINEUM
LOCATION: ABDOMEN
LOCATION: ABDOMEN;PERINEUM
LOCATION: ABDOMEN

## 2024-10-11 NOTE — PROGRESS NOTES
Post-Partum Day Number 1 Progress Note      Patient doing well post-partum without significant complaint.  She is voiding without difficulty, she reports normal lochia. She is ambulatiing without dizziness.  Her pain is well controlled with oral pain medication. She is tolerating general diet.  She is struggling with getting sleep.      Vitals:  Patient Vitals for the past 8 hrs:   BP Temp Temp src Pulse Resp SpO2   10/11/24 0210 95/65 98.1 °F (36.7 °C) Oral 95 16 98 %     Temp (24hrs), Av.5 °F (36.9 °C), Min:98.1 °F (36.7 °C), Max:98.7 °F (37.1 °C)        Exam:  Patient without distress.                          Abdomen soft, nontender, nondistended, normal bowel sounds               Uterus: fundus firm at level of umbilicus, nontender               Lower extremities are negative for cords or tenderness, +1 swelling.    Labs:   Recent Results (from the past 24 hour(s))   RPR    Collection Time: 10/10/24 11:14 AM   Result Value Ref Range    RPR NONREACTIVE NR     CBC    Collection Time: 10/10/24 11:14 AM   Result Value Ref Range    WBC 11.0 3.6 - 11.0 K/uL    RBC 4.71 3.80 - 5.20 M/uL    Hemoglobin 14.6 11.5 - 16.0 g/dL    Hematocrit 42.9 35.0 - 47.0 %    MCV 91.1 80.0 - 99.0 FL    MCH 31.0 26.0 - 34.0 PG    MCHC 34.0 30.0 - 36.5 g/dL    RDW 14.2 11.5 - 14.5 %    Platelets 230 150 - 400 K/uL    MPV 12.4 8.9 - 12.9 FL    Nucleated RBCs 0.0 0  WBC    nRBC 0.00 0.00 - 0.01 K/uL   TYPE AND SCREEN    Collection Time: 10/10/24 11:14 AM   Result Value Ref Range    Crossmatch expiration date 10/13/2024,7662     ABO/Rh B POSITIVE     Antibody Screen NEG        No components found for: \"OBEXTABORH\", \"OBEXTABSCRN\", \"OBEXTRUBELLA\", \"OBEXTGRBS\", \"OBEXTHBSAG\", \"OBEXTHIV\", \"OBEXTRPR\", \"OBEXTGONORR\", \"OBEXTCHLAM\"    Assessment and Plan:   Postpartum Day #1 S/P .  Doing well.   - routine care   - anticipate discharge in AM

## 2024-10-12 VITALS
SYSTOLIC BLOOD PRESSURE: 105 MMHG | OXYGEN SATURATION: 95 % | RESPIRATION RATE: 17 BRPM | TEMPERATURE: 98.6 F | DIASTOLIC BLOOD PRESSURE: 68 MMHG | HEART RATE: 84 BPM

## 2024-10-12 PROBLEM — K35.80 ACUTE APPENDICITIS: Status: RESOLVED | Noted: 2018-06-25 | Resolved: 2024-10-12

## 2024-10-12 PROBLEM — K62.5 RECTAL BLEEDING: Status: RESOLVED | Noted: 2018-01-09 | Resolved: 2024-10-12

## 2024-10-12 PROCEDURE — 6370000000 HC RX 637 (ALT 250 FOR IP): Performed by: OBSTETRICS & GYNECOLOGY

## 2024-10-12 PROCEDURE — APPNB30 APP NON BILLABLE TIME 0-30 MINS: Performed by: ADVANCED PRACTICE MIDWIFE

## 2024-10-12 RX ADMIN — IBUPROFEN 800 MG: 400 TABLET, FILM COATED ORAL at 04:05

## 2024-10-12 ASSESSMENT — PAIN DESCRIPTION - ORIENTATION: ORIENTATION: LEFT;RIGHT;PROXIMAL

## 2024-10-12 ASSESSMENT — PAIN DESCRIPTION - DESCRIPTORS: DESCRIPTORS: ACHING;SORE

## 2024-10-12 ASSESSMENT — PAIN DESCRIPTION - LOCATION: LOCATION: ABDOMEN

## 2024-10-12 ASSESSMENT — PAIN SCALES - GENERAL: PAINLEVEL_OUTOF10: 5

## 2024-10-12 NOTE — DISCHARGE SUMMARY
Obstetrical Discharge Summary     Name: Juliana Hancock MRN: [id number]  SSN: [id number]    YOB: 2000  Age: 24 y.o.  Sex: female      Admit Date: 10/10/2024    Discharge Date: 10/12/2024     Admitting Physician: Willis Rush MD     Attending Physician:  Lucila Katz MD     Discharge Provider:  LORENA Lucas [facility]     Admission Diagnoses: Abdominal pain affecting pregnancy [O26.899, R10.9]    Discharge Diagnoses:   Information for the patient's :  Fransisco Alcala [[id number]]   @[id number]@     Additional Diagnoses:  No components found for: \"OBEXTABORH\", \"OBEXTABSCRN\", \"OBEXTRUBELLA\", \"OBEXTGRBS\"    Hospital Course:   The patient was admitted 10/10/24 with a cervical exam of  / / .    She received antibiotics received during labor?: No   Type of anesthesia received: Nitrous Oxide    24 y.o.  female at 40w5d   Date/Time of Birth: @BABYSUPPRESS(admitdttm)@   Delivery Type: Vaginal, Spontaneous  Delivering clinician: Elaine Bermudez  Fetal weight: 3.745 kg (8 lb 4.1 oz)  APGARs of 9 and 9 at 1 and 5 minutes, respectively.  Complications: None    [unfilled]   The patient's postpartum course was uncomplicated.  On the day of discharge, the patient was ambulating, voiding spontaneously, tolerating oral intake, and her pain was satisfactorily controlled.  Therefore she was deemed stable for discharge.       Disposition at Discharge: Home or self care    Code Status at Discharge: Full Code      Discharged Condition: Stable    Patient Instructions:   Current Discharge Medication List        CONTINUE these medications which have NOT CHANGED    Details   vitamin C (ASCORBIC ACID) 500 MG tablet Take 2 tablets by mouth daily      acetaminophen (TYLENOL) 500 MG tablet Take 1 tablet by mouth every 6 hours as needed for Pain      Prenatal Vit-Fe Fumarate-FA (PRENATAL VITAMIN) 27-0.8 MG TABS Take 1 each by mouth daily  Qty: 30 tablet, Refills: 5           STOP  taking these medications       MAGNESIUM PO Comments:   Reason for Stopping:               Reference my discharge instructions.    No follow-ups on file.     I spent 30 minutes with the patient to perform a final examination, discuss the hospital stay, give instructions for continuing care to all relevant caregivers and prepare discharge records, prescriptions and referral forms.      Signed By:  LORENA Lucas CNM     October 12, 2024, 11:16 AM

## 2024-10-12 NOTE — LACTATION NOTE
This note was copied from a baby's chart.  Baby nursing well and has improved throughout post partum stay, deep latch maintained, mother is comfortable, milk is in transition, baby feeding vigorously with rhythmic suck, swallow, breathe pattern, with audible swallowing, and evident milk transfer, both breasts offered, baby is asleep following feeding. Baby is feeding on demand, voiding and stools present as appropriate since birth. Weight loss:  5.8%    Breasts may become engorged when milk \"comes in\".  How milk is made / normal phases of milk production, supply and demand discussed.  Taught care of engorged breasts - frequent breastfeeding encouraged and breast massage ac. Then nurse the baby (or pump minimally for comfort). Apply cold compresses ac and/or pc x 15 minutes a few times a day for swelling or discomfort if necessary.  May need to do this care for a couple of days.Discussed prevention and treatment of mastitis.

## 2024-10-12 NOTE — PROGRESS NOTES
Postpartum  note  Obstetrics Postpartum Progress Note  10/12/2024    Events  No Events      Subjective  Pain: controlled with current medications  Lochia: Scant  PO's: taking regular diet, tolerating well  Voiding: voiding well  Ambulating: yes  Feeding: breast    Vitals  /68   Pulse 84   Temp 98.6 °F (37 °C) (Oral)   Resp 17   LMP 2023 (Approximate)   SpO2 95%   Breastfeeding Unknown   Temp (24hrs), Av.2 °F (36.8 °C), Min:97.9 °F (36.6 °C), Max:98.6 °F (37 °C)      Physical Exam [unfilled]  General:alert and oriented times 3, patient without distress  Pulm: Lungs clear to Auscultation Bilat, unlabored breathing  Fundus: Firm, Midline at U/-1, appropriately tender.  Perineum:  Well-approximated (10/12/24 0405 : Valerie Alva, RN) (per RN documentation)  Breasts soft, NT  Extremities: Lower extremities are negative cords or tenderness  Edema: trace to 1+ bilateral pedal edema  Skin: warm dry        Data  @LABRCNTIP(wbc:3,hgb:3,hct:3,plt:3,creatinine:3,ast:3,alt:3)@  Immunization History   Administered Date(s) Administered    BCG ([place] BCG) 1999    DTaP vaccine 1999, 1999, 1999, 2001, 2007    HPV (Human Papilloma Virus)Vaccine 2010, 2012    HPV Quadrivalent (Gardasil) 2014    Hepatitis A Vaccine 2007, 2009    Hepatitis B vaccine 2007, 2009, 2009    MMR, PRIORIX, M-M-R II, (age 12m+), SC, 0.5mL 2007, 2009    Measles 2000, 2001    Meningococcal ACWY Vaccine 2010    Pertussis Vaccine 2009    Polio Virus Vaccine 1999, 1999, 1999, 1999, 2001, 2007    TDaP, ADACEL (age 10y-64y), BOOSTRIX (age 10y+), IM, 0.5mL 2009, 2024    Varicella, VARIVAX, (age 12m+), SC, 0.5mL 2007, 2009     Lab Results   Component Value Date    ABORH B POSITIVE 10/10/2024     @BABYSUPPRESS(resufast,neorh)@       Problem-based Assessment

## 2024-10-12 NOTE — DISCHARGE INSTRUCTIONS
Postpartum Support Groups   We know that all of us are dealing with a tremendous amount of uncertainty, confusion and disruption to our daily lives, which may result in increased anxiety, depression and fear. If you are feeling unsettled or worse, please know that we are here to help. During this time of increased caution and care for one another, [facility] [place] ([facility]) is offering virtual and in person support groups to ALL MOTHERS in [place] regardless of the age of your child/children as a way to help weather this emotional storm together. Social support is an important part of self-care during this time of physical distancing.  Virtual postpartum support group meetings available at www.postpartumva.org  Warm Line: [phone]    Breastfeeding Support Groups   1st and 3rd Wednesday of each month at [facility]  2nd and 4th Tuesday of each month at [facility] (in education center behind cafeteria)    [email or web address]      POST DELIVERY DISCHARGE INSTRUCTIONS      Name: Juliana Y Fausto Santi  YOB: 2000  Primary Diagnosis: [unfilled]    General:     Diet/Diet Restrictions:  Eight 8-ounce glasses of fluid daily (water, juices); avoid excessive caffeine intake.  Meals/snacks as desired which are high in fiber and carbohydrates and low in fat and cholesterol.    Medications:   {Medication reconciliation information is now added to the patient's AVS automatically when it is printed.  There is no need to use this SmartLink in discharge instructions.  Highlight this text and delete it to clear this message}      Physical Activity / Restrictions / Safety:     For vaginal deliveries, be up and moving around but remember to rest! Your body grew and birthed a small human! Be kind to yourself and allow your body to heal. You may gradually resume your normal activities as soon as you feel up to it. You may begin walking and strolling with

## 2024-11-21 ENCOUNTER — POSTPARTUM VISIT (OUTPATIENT)
Age: 24
End: 2024-11-21

## 2024-11-21 VITALS
OXYGEN SATURATION: 95 % | TEMPERATURE: 98.7 F | RESPIRATION RATE: 16 BRPM | BODY MASS INDEX: 29.32 KG/M2 | DIASTOLIC BLOOD PRESSURE: 76 MMHG | HEIGHT: 66 IN | WEIGHT: 182.4 LBS | SYSTOLIC BLOOD PRESSURE: 114 MMHG | HEART RATE: 76 BPM

## 2024-11-21 PROCEDURE — 0503F POSTPARTUM CARE VISIT: CPT

## 2024-11-21 NOTE — PROGRESS NOTES
Postpartum note    Patient here for postpartum visit.  PHYSICAL EXAMINATION    Constitutional  Appearance: well-nourished, well developed, alert, in no acute distress    HENT  Head and Face: appears normal    Gastrointestinal  Abdominal Examination: abdomen non-tender to palpation, normal bowel sounds, no masses present  Liver and spleen: no hepatomegaly present, spleen not palpable  Hernias: no hernias identified    Genitourinary  External Genitalia: normal appearance for age, no discharge present, no tenderness present, no inflammatory lesions present, no masses present, no atrophy present  Vagina: normal vaginal vault without central or paravaginal defects, no discharge present, no inflammatory lesions present, no masses present  Bladder: non-tender to palpation  Urethra: appears normal  Cervix: normal   Uterus: normal size, shape and consistency  Adnexa: no adnexal tenderness present, no adnexal masses present  Perineum: perineum within normal limits, no evidence of trauma, no rashes or skin lesions present  Anus: anus within normal limits, no hemorrhoids present  Inguinal Lymph Nodes: no lymphadenopathy present    Skin  General Inspection: no rash, no lesions identified    Neurologic/Psychiatric  Mental Status:  Orientation: grossly oriented to person, place and time  Mood and Affect: mood normal, affect appropriate    Assessment:  Normal postpartum check    Plan:  Patient declines presence of chaperone during today's visit.   RTO for AE.  EPDS 6--feeling she is doing well overall  Rx for contraception: RTO for nexplanon. Aware to abstain or use condoms for at least 10 days to 2 weeks prior to insertion    LORENA Monahan - TRAVIS

## 2024-11-21 NOTE — PROGRESS NOTES
Juliana Hancock is a 24 y.o. female returns for a routine post-partum follow-up visit     Chief Complaint   Patient presents with    Postpartum Care       Postpartum Depression: Low Risk  (10/12/2024)    [place]  Depression Scale     Last EPDS Total Score: 4     Last EPDS Self Harm Result: Never         Type of delivery: normal spontaneous vaginal delivery  Date of Delivery: October 10, 2024  Breastfeeding: yes  Bleeding Resolved: yes  Birth Control: none.  Last Pap: Unsure        Problems: no problems    1. Have you been to the ER, urgent care clinic, or hospitalized since your last visit? Yes [facility]    2. Have you seen or consulted any other health care providers outside of the [facility] since your last visit? Yes    Examination chaperoned by Cris Agarwal LPN.

## 2024-11-21 NOTE — PERIOP NOTE
[facility]  Ambulatory Surgery Unit  Pre-operative Instructions    Surgery/Procedure Date  Monday, December 2, 2024            Tentative Arrival Time TBD      1. On the day of your surgery/procedure, please report to the Ambulatory Surgery Unit Registration Desk and sign in at your designated time. The Ambulatory Surgery Unit is located in [facility] on the [facility] across from the [facility]. Please have all of your health insurance cards, co-payment, and a photo ID.    **TWO adults may accompany you the day of the procedure.  We have limited seating available.      2. You cannot be dropped off for surgery.  Please make arrangements for a responsible adult friend or family member to remain on the hospital campus during your procedure, and drive you home, as you should not drive for 24 hours following anesthesia. Make arrangements for a responsible adult to stay with you for at least the first 24 hours after your surgery.    3. Do not have anything to eat or drink (including water, gum, mints, coffee, juice) after 11:59 PM, Sunday. This may not apply to medications prescribed by your physician.  (Please note below the special instructions with medications to take the morning of surgery, if applicable.)    4. We recommend you do not drink any alcoholic beverages for 24 hours before and after your surgery.    5. Contact your surgeon’s office for instructions on the following medications: non-steroidal anti-inflammatory drugs (i.e. Advil, Aleve), vitamins, and supplements. (Some surgeon’s will want you to stop these medications prior to surgery and others may allow you to take them)   **If you are currently taking Plavix, Coumadin, Aspirin and/or other blood-thinning agents, contact your surgeon for instructions.** Your surgeon will partner with the physician prescribing these medications to determine if it is safe to stop or if you need to continue taking.

## 2024-11-27 ENCOUNTER — ANESTHESIA EVENT (OUTPATIENT)
Facility: HOSPITAL | Age: 24
End: 2024-11-27
Payer: COMMERCIAL

## 2024-12-02 ENCOUNTER — ANESTHESIA (OUTPATIENT)
Facility: HOSPITAL | Age: 24
End: 2024-12-02
Payer: COMMERCIAL

## 2024-12-02 ENCOUNTER — HOSPITAL ENCOUNTER (OUTPATIENT)
Facility: HOSPITAL | Age: 24
Setting detail: OUTPATIENT SURGERY
Discharge: HOME | End: 2024-12-02
Attending: SPECIALIST | Admitting: SPECIALIST
Payer: COMMERCIAL

## 2024-12-02 VITALS
BODY MASS INDEX: 29.73 KG/M2 | SYSTOLIC BLOOD PRESSURE: 114 MMHG | DIASTOLIC BLOOD PRESSURE: 80 MMHG | HEART RATE: 65 BPM | HEIGHT: 66 IN | OXYGEN SATURATION: 100 % | WEIGHT: 185 LBS | RESPIRATION RATE: 11 BRPM | TEMPERATURE: 97.2 F

## 2024-12-02 LAB — HCG UR QL: NEGATIVE

## 2024-12-02 PROCEDURE — 2709999900 HC NON-CHARGEABLE SUPPLY: Performed by: SPECIALIST

## 2024-12-02 PROCEDURE — 3600000012 HC SURGERY LEVEL 2 ADDTL 15MIN: Performed by: SPECIALIST

## 2024-12-02 PROCEDURE — 6360000002 HC RX W HCPCS: Performed by: SPECIALIST

## 2024-12-02 PROCEDURE — 6360000002 HC RX W HCPCS: Performed by: NURSE ANESTHETIST, CERTIFIED REGISTERED

## 2024-12-02 PROCEDURE — 81025 URINE PREGNANCY TEST: CPT

## 2024-12-02 PROCEDURE — 3600000002 HC SURGERY LEVEL 2 BASE: Performed by: SPECIALIST

## 2024-12-02 PROCEDURE — 7100000010 HC PHASE II RECOVERY - FIRST 15 MIN: Performed by: SPECIALIST

## 2024-12-02 PROCEDURE — 7100000000 HC PACU RECOVERY - FIRST 15 MIN: Performed by: SPECIALIST

## 2024-12-02 PROCEDURE — 6360000002 HC RX W HCPCS: Performed by: ANESTHESIOLOGY

## 2024-12-02 PROCEDURE — 7100000001 HC PACU RECOVERY - ADDTL 15 MIN: Performed by: SPECIALIST

## 2024-12-02 PROCEDURE — 3700000001 HC ADD 15 MINUTES (ANESTHESIA): Performed by: SPECIALIST

## 2024-12-02 PROCEDURE — 3700000000 HC ANESTHESIA ATTENDED CARE: Performed by: SPECIALIST

## 2024-12-02 PROCEDURE — 6370000000 HC RX 637 (ALT 250 FOR IP): Performed by: SPECIALIST

## 2024-12-02 PROCEDURE — 2580000003 HC RX 258: Performed by: ANESTHESIOLOGY

## 2024-12-02 PROCEDURE — 6370000000 HC RX 637 (ALT 250 FOR IP)

## 2024-12-02 PROCEDURE — 6360000002 HC RX W HCPCS

## 2024-12-02 RX ORDER — FENTANYL CITRATE 50 UG/ML
INJECTION, SOLUTION INTRAMUSCULAR; INTRAVENOUS
Status: DISCONTINUED | OUTPATIENT
Start: 2024-12-02 | End: 2024-12-02 | Stop reason: SDUPTHER

## 2024-12-02 RX ORDER — SODIUM CHLORIDE 0.9 % (FLUSH) 0.9 %
5-40 SYRINGE (ML) INJECTION PRN
Status: DISCONTINUED | OUTPATIENT
Start: 2024-12-02 | End: 2024-12-02 | Stop reason: HOSPADM

## 2024-12-02 RX ORDER — FENTANYL CITRATE 50 UG/ML
INJECTION, SOLUTION INTRAMUSCULAR; INTRAVENOUS
Status: COMPLETED
Start: 2024-12-02 | End: 2024-12-02

## 2024-12-02 RX ORDER — KETOROLAC TROMETHAMINE 30 MG/ML
INJECTION, SOLUTION INTRAMUSCULAR; INTRAVENOUS
Status: DISCONTINUED | OUTPATIENT
Start: 2024-12-02 | End: 2024-12-02 | Stop reason: SDUPTHER

## 2024-12-02 RX ORDER — POVIDONE-IODINE 5 %
SOLUTION, NON-ORAL OPHTHALMIC (EYE) PRN
Status: DISCONTINUED | OUTPATIENT
Start: 2024-12-02 | End: 2024-12-02 | Stop reason: ALTCHOICE

## 2024-12-02 RX ORDER — DEXAMETHASONE SODIUM PHOSPHATE 4 MG/ML
INJECTION, SOLUTION INTRA-ARTICULAR; INTRALESIONAL; INTRAMUSCULAR; INTRAVENOUS; SOFT TISSUE
Status: DISCONTINUED | OUTPATIENT
Start: 2024-12-02 | End: 2024-12-02 | Stop reason: SDUPTHER

## 2024-12-02 RX ORDER — DEXAMETHASONE SODIUM PHOSPHATE 4 MG/ML
INJECTION, SOLUTION INTRA-ARTICULAR; INTRALESIONAL; INTRAMUSCULAR; INTRAVENOUS; SOFT TISSUE PRN
Status: DISCONTINUED | OUTPATIENT
Start: 2024-12-02 | End: 2024-12-02 | Stop reason: ALTCHOICE

## 2024-12-02 RX ORDER — KETOROLAC TROMETHAMINE 30 MG/ML
30 INJECTION, SOLUTION INTRAMUSCULAR; INTRAVENOUS
Status: DISCONTINUED | OUTPATIENT
Start: 2024-12-02 | End: 2024-12-02 | Stop reason: HOSPADM

## 2024-12-02 RX ORDER — NALOXONE HYDROCHLORIDE 0.4 MG/ML
INJECTION, SOLUTION INTRAMUSCULAR; INTRAVENOUS; SUBCUTANEOUS PRN
Status: DISCONTINUED | OUTPATIENT
Start: 2024-12-02 | End: 2024-12-02 | Stop reason: HOSPADM

## 2024-12-02 RX ORDER — NEOMYCIN SULFATE, POLYMYXIN B SULFATE, AND DEXAMETHASONE 3.5; 10000; 1 MG/G; [USP'U]/G; MG/G
OINTMENT OPHTHALMIC PRN
Status: DISCONTINUED | OUTPATIENT
Start: 2024-12-02 | End: 2024-12-02 | Stop reason: ALTCHOICE

## 2024-12-02 RX ORDER — MEPERIDINE HYDROCHLORIDE 25 MG/ML
12.5 INJECTION INTRAMUSCULAR; INTRAVENOUS; SUBCUTANEOUS EVERY 5 MIN PRN
Status: DISCONTINUED | OUTPATIENT
Start: 2024-12-02 | End: 2024-12-02 | Stop reason: HOSPADM

## 2024-12-02 RX ORDER — SODIUM CHLORIDE 9 MG/ML
INJECTION, SOLUTION INTRAVENOUS PRN
Status: DISCONTINUED | OUTPATIENT
Start: 2024-12-02 | End: 2024-12-02 | Stop reason: HOSPADM

## 2024-12-02 RX ORDER — OXYCODONE HYDROCHLORIDE 5 MG/1
10 TABLET ORAL PRN
Status: DISCONTINUED | OUTPATIENT
Start: 2024-12-02 | End: 2024-12-02 | Stop reason: HOSPADM

## 2024-12-02 RX ORDER — OXYCODONE HYDROCHLORIDE 5 MG/1
5 TABLET ORAL PRN
Status: DISCONTINUED | OUTPATIENT
Start: 2024-12-02 | End: 2024-12-02 | Stop reason: HOSPADM

## 2024-12-02 RX ORDER — FENTANYL CITRATE 50 UG/ML
25 INJECTION, SOLUTION INTRAMUSCULAR; INTRAVENOUS EVERY 5 MIN PRN
Status: COMPLETED | OUTPATIENT
Start: 2024-12-02 | End: 2024-12-02

## 2024-12-02 RX ORDER — IBUPROFEN 200 MG
400 TABLET ORAL EVERY 6 HOURS PRN
COMMUNITY

## 2024-12-02 RX ORDER — PHENYLEPHRINE HCL IN 0.9% NACL 0.4MG/10ML
SYRINGE (ML) INTRAVENOUS
Status: DISCONTINUED | OUTPATIENT
Start: 2024-12-02 | End: 2024-12-02 | Stop reason: SDUPTHER

## 2024-12-02 RX ORDER — LIDOCAINE HYDROCHLORIDE 10 MG/ML
1 INJECTION, SOLUTION EPIDURAL; INFILTRATION; INTRACAUDAL; PERINEURAL
Status: DISCONTINUED | OUTPATIENT
Start: 2024-12-02 | End: 2024-12-02 | Stop reason: HOSPADM

## 2024-12-02 RX ORDER — SODIUM CHLORIDE 0.9 % (FLUSH) 0.9 %
5-40 SYRINGE (ML) INJECTION EVERY 12 HOURS SCHEDULED
Status: DISCONTINUED | OUTPATIENT
Start: 2024-12-02 | End: 2024-12-02 | Stop reason: HOSPADM

## 2024-12-02 RX ORDER — LIDOCAINE HYDROCHLORIDE AND EPINEPHRINE BITARTRATE 20; .01 MG/ML; MG/ML
INJECTION, SOLUTION SUBCUTANEOUS PRN
Status: DISCONTINUED | OUTPATIENT
Start: 2024-12-02 | End: 2024-12-02 | Stop reason: ALTCHOICE

## 2024-12-02 RX ORDER — MIDAZOLAM HYDROCHLORIDE 1 MG/ML
INJECTION, SOLUTION INTRAMUSCULAR; INTRAVENOUS
Status: DISCONTINUED | OUTPATIENT
Start: 2024-12-02 | End: 2024-12-02 | Stop reason: SDUPTHER

## 2024-12-02 RX ORDER — ACETAMINOPHEN 500 MG
TABLET ORAL
Status: COMPLETED
Start: 2024-12-02 | End: 2024-12-02

## 2024-12-02 RX ORDER — ACETAMINOPHEN 500 MG
1000 TABLET ORAL
Status: COMPLETED | OUTPATIENT
Start: 2024-12-02 | End: 2024-12-02

## 2024-12-02 RX ORDER — ONDANSETRON 2 MG/ML
4 INJECTION INTRAMUSCULAR; INTRAVENOUS
Status: DISCONTINUED | OUTPATIENT
Start: 2024-12-02 | End: 2024-12-02 | Stop reason: HOSPADM

## 2024-12-02 RX ORDER — ONDANSETRON 2 MG/ML
INJECTION INTRAMUSCULAR; INTRAVENOUS
Status: DISCONTINUED | OUTPATIENT
Start: 2024-12-02 | End: 2024-12-02 | Stop reason: SDUPTHER

## 2024-12-02 RX ORDER — SODIUM CHLORIDE, SODIUM LACTATE, POTASSIUM CHLORIDE, CALCIUM CHLORIDE 600; 310; 30; 20 MG/100ML; MG/100ML; MG/100ML; MG/100ML
INJECTION, SOLUTION INTRAVENOUS CONTINUOUS
Status: DISCONTINUED | OUTPATIENT
Start: 2024-12-02 | End: 2024-12-02 | Stop reason: HOSPADM

## 2024-12-02 RX ORDER — TETRACAINE HYDROCHLORIDE 5 MG/ML
SOLUTION OPHTHALMIC PRN
Status: DISCONTINUED | OUTPATIENT
Start: 2024-12-02 | End: 2024-12-02 | Stop reason: ALTCHOICE

## 2024-12-02 RX ADMIN — Medication 1000 MG: at 14:57

## 2024-12-02 RX ADMIN — Medication 80 MCG: at 13:26

## 2024-12-02 RX ADMIN — FENTANYL CITRATE 25 MCG: 50 INJECTION INTRAMUSCULAR; INTRAVENOUS at 14:37

## 2024-12-02 RX ADMIN — PROPOFOL 200 MG: 10 INJECTION, EMULSION INTRAVENOUS at 13:11

## 2024-12-02 RX ADMIN — KETOROLAC TROMETHAMINE 30 MG: 30 INJECTION, SOLUTION INTRAMUSCULAR at 14:19

## 2024-12-02 RX ADMIN — Medication 80 MCG: at 14:02

## 2024-12-02 RX ADMIN — FENTANYL CITRATE 50 MCG: 50 INJECTION, SOLUTION INTRAMUSCULAR; INTRAVENOUS at 13:20

## 2024-12-02 RX ADMIN — FENTANYL CITRATE 50 MCG: 50 INJECTION, SOLUTION INTRAMUSCULAR; INTRAVENOUS at 13:15

## 2024-12-02 RX ADMIN — ACETAMINOPHEN 1000 MG: 500 TABLET ORAL at 14:57

## 2024-12-02 RX ADMIN — FENTANYL CITRATE 25 MCG: 50 INJECTION INTRAMUSCULAR; INTRAVENOUS at 14:44

## 2024-12-02 RX ADMIN — SODIUM CHLORIDE: 9 INJECTION, SOLUTION INTRAVENOUS at 13:08

## 2024-12-02 RX ADMIN — MIDAZOLAM HYDROCHLORIDE 2 MG: 1 INJECTION, SOLUTION INTRAMUSCULAR; INTRAVENOUS at 13:08

## 2024-12-02 RX ADMIN — ONDANSETRON 4 MG: 2 INJECTION INTRAMUSCULAR; INTRAVENOUS at 14:11

## 2024-12-02 RX ADMIN — FENTANYL CITRATE 25 MCG: 50 INJECTION INTRAMUSCULAR; INTRAVENOUS at 15:02

## 2024-12-02 RX ADMIN — DEXAMETHASONE SODIUM PHOSPHATE 8 MG: 4 INJECTION, SOLUTION INTRAMUSCULAR; INTRAVENOUS at 14:11

## 2024-12-02 RX ADMIN — Medication 80 MCG: at 13:35

## 2024-12-02 RX ADMIN — FENTANYL CITRATE 25 MCG: 50 INJECTION INTRAMUSCULAR; INTRAVENOUS at 15:09

## 2024-12-02 RX ADMIN — LIDOCAINE HYDROCHLORIDE 50 MG: 20 INJECTION, SOLUTION EPIDURAL; INFILTRATION; INTRACAUDAL; PERINEURAL at 13:11

## 2024-12-02 ASSESSMENT — PAIN DESCRIPTION - LOCATION
LOCATION: EYE

## 2024-12-02 ASSESSMENT — PAIN SCALES - GENERAL
PAINLEVEL_OUTOF10: 7
PAINLEVEL_OUTOF10: 7
PAINLEVEL_OUTOF10: 5
PAINLEVEL_OUTOF10: 7
PAINLEVEL_OUTOF10: 7
PAINLEVEL_OUTOF10: 6

## 2024-12-02 ASSESSMENT — PAIN DESCRIPTION - ORIENTATION
ORIENTATION: RIGHT;LEFT
ORIENTATION: LEFT;RIGHT
ORIENTATION: RIGHT;LEFT
ORIENTATION: RIGHT;LEFT

## 2024-12-02 ASSESSMENT — PAIN - FUNCTIONAL ASSESSMENT: PAIN_FUNCTIONAL_ASSESSMENT: 0-10

## 2024-12-02 NOTE — PERIOP NOTE
Juliana Hancock  2000  [id number]    Situation:  Verbal report given from: Jona Buitrago CRNA, April Snow RN  Procedure: Procedure(s):  CHALAZION INCISION AND DRAINAGE:   RIGHT UPPER AND LOWER EYELIDS, LEFT UPPER AND LOWER EYELIDS    Background:    Preoperative diagnosis: Chalazion of right upper eyelid [H00.11]  Chalazion of right lower eyelid [H00.12]  Chalazion of left upper eyelid [H00.14]  Chalazion of left lower eyelid [H00.15]    Postoperative diagnosis: * No post-op diagnosis entered *    :  Dr. Khan    Assistant(s): Circulator: Anjali Dee RN  Scrub Person First: Anjum Pradhan; Santo Sanderson  Circulator Assist: April Snow RN    Specimens: * No specimens in log *    Assessment:  Intra-procedure medications         Anesthesia gave intra-procedure sedation and medications, see anesthesia flow sheet     Intravenous fluids: LR@ KVO     Vital signs stable       Recommendation:    Permission to share finding with mother

## 2024-12-02 NOTE — ANESTHESIA POSTPROCEDURE EVALUATION
Department of Anesthesiology  Postprocedure Note    Patient: Juliana Hancock  MRN: [id number]  YOB: 2000  Date of evaluation: 12/2/2024    Procedure Summary       Date: 12/02/24 Room / Location: [facility] ASU  / [facility] AMBULATORY OR    Anesthesia Start: 1308 Anesthesia Stop: 1433    Procedure: CHALAZION INCISION AND DRAINAGE:   RIGHT UPPER AND LOWER EYELIDS, LEFT UPPER AND LOWER EYELIDS (Bilateral: Eye) Diagnosis:       Chalazion of right upper eyelid      Chalazion of right lower eyelid      Chalazion of left upper eyelid      Chalazion of left lower eyelid      (Chalazion of right upper eyelid [H00.11])      (Chalazion of right lower eyelid [H00.12])      (Chalazion of left upper eyelid [H00.14])      (Chalazion of left lower eyelid [H00.15])    Surgeons: James Khan Jr., MD Responsible Provider: Olivia Salgado MD    Anesthesia Type: General ASA Status: 2            Anesthesia Type: General    Dale Phase I: Dale Score: 9    Dale Phase II: Dale Score: 9    Anesthesia Post Evaluation    Patient location during evaluation: PACU  Patient participation: complete - patient participated  Level of consciousness: awake and alert  Pain score: 5  Airway patency: patent  Nausea & Vomiting: no vomiting and no nausea  Cardiovascular status: blood pressure returned to baseline and hemodynamically stable  Respiratory status: acceptable  Hydration status: stable  Multimodal analgesia pain management approach  Pain management: satisfactory to patient    No notable events documented.

## 2024-12-02 NOTE — PERIOP NOTE
Permission received to review discharge instructions and private health information with mother  and will have family/friends home with them for 24 hours.

## 2024-12-02 NOTE — PERIOP NOTE
1431-Pt. To pacu, vss.  C/o pain to bilateral eyes level 7/10.  Warm compresses placed to eyes.    1437-Medicated w/fentanyl for pain to bilateral eyes level 7/10.  Will monitor.    1444-Medicated w/fentanyl 25 mcg iv for pain 6/10.  Will monitor.    1457-Medicated w/tylenol 1000 mg for pain 7/10.  Will monitor.    1502-Medicated w/fentanyl 25 mcg iv for pain 7/10.  Will monitor.    1509-Medicated w/fentanyl 25 mcg iv for pain 6/10.  Will monitor.    1515-Discharge instructions reviewed w/pt.  Reviewed w/pts mother using language line .    1538-Pt. States ready for discharge.  Vss.  States pain to bilateral eyelids is 5/10, states is tolerable.  Pt discharged via wheelchair to car, accompanied by RN.  Pt discharged awake and alert, respirations equal and unlabored, skin warm, dry, and intact.  Pt and family members' questions and concerns addressed prior to discharge.

## 2024-12-02 NOTE — DISCHARGE INSTRUCTIONS
Discharge Instructions Post Chelazion Surgery  [facility]/ Dr. James Khan  [address], [address]  [place] [address]  Phone: [phone]  Fax: [phone]    1.   Rest today.  2.  May use warm compresses to affected eye 4 times per day  3.   Use ointment 4 times per day after warm compresses  4.  Please take Tylenol, Ibuprofen, or Aleve as needed for discomfort.  5.  Follow up with Dr. Khan in 1 week.    >>>You received Toradol during your surgery. You may not take any form of NSAIDS (non steroidal anti inflammatory drugs) such as Advil, Ibuprofen, Aleve, Motrin until 8:15pm.<<<    >>>You received Tylenol 1000mg prior to your surgery, you may take Tylenol (or pain medication containing Tylenol or Acetaminophen) in 6 hours at 9pm.<<<    DO NOT TAKE SLEEPING MEDICATIONS OR ANTIANXIETY MEDICATIONS WHILE TAKING NARCOTIC PAIN MEDICATIONS,  ESPECIALLY THE NIGHT OF ANESTHESIA.    CPAP PATIENTS BE SURE TO WEAR MACHINE WHENEVER NAPPING OR SLEEPING.    DISCHARGE SUMMARY from Nurse    The following personal items collected during your admission are returned to you:   Dental Appliance:    Vision:    Hearing Aid:    Jewelry:    Clothing:    Other Valuables:    Valuables sent to safe:      PATIENT INSTRUCTIONS:    After general anesthesia or intravenous sedation, for 24 hours or while taking prescription Narcotics:  Someone should be with you for the next 24 hours.  For your own safety, a responsible adult must drive you home.  Limit your activities  Recommended activity: Rest today, up with assistance today. Do not climb stairs or shower unattended for the next 24 hours.  Please start with a soft bland diet and advance as tolerated (no nausea) to regular diet.  Do not drive and operate hazardous machinery  Do not make important personal or business decisions  Do  not drink alcoholic beverages  If you have not urinated within 8 hours after discharge, please contact your surgeon on call.    Report the following

## 2024-12-02 NOTE — ANESTHESIA PRE PROCEDURE
Department of Anesthesiology  Preprocedure Note       Name:  Juliana Hancock   Age:  24 y.o.  :  2000                                          MRN:  [id number]         Date:  2024      Surgeon: Surgeon(s):  James Khan Jr., MD    Procedure: Procedure(s):  CHALAZION INCISION AND DRAINAGE:   RIGHT UPPER AND LOWER EYELIDS, LEFT UPPER AND LOWER EYELIDS    Medications prior to admission:   Prior to Admission medications    Medication Sig Start Date End Date Taking? Authorizing Provider   ibuprofen (ADVIL;MOTRIN) 200 MG tablet Take 2 tablets by mouth every 6 hours as needed for Pain   Yes Provider, MD Cari   vitamin C (ASCORBIC ACID) 500 MG tablet Take 2 tablets by mouth daily   Yes ProviderCari MD   acetaminophen (TYLENOL) 500 MG tablet Take 1 tablet by mouth every 6 hours as needed for Pain   Yes ProviderCari MD   Prenatal Vit-Fe Fumarate-FA (PRENATAL VITAMIN) 27-0.8 MG TABS Take 1 each by mouth daily 24  Yes Keyana Osborne APRN - TANYA       Current medications:    Current Facility-Administered Medications   Medication Dose Route Frequency Provider Last Rate Last Admin   • lidocaine PF 1 % injection 1 mL  1 mL IntraDERmal Once PRN Olivia Salgado MD       • lactated ringers infusion   IntraVENous Continuous Olivia Salgado MD       • sodium chloride flush 0.9 % injection 5-40 mL  5-40 mL IntraVENous PRN Olivia Salgado MD       • 0.9 % sodium chloride infusion   IntraVENous PRN Olivia Salgado MD           Allergies:  No Known Allergies    Problem List:    Patient Active Problem List   Diagnosis Code   • Less than 8 weeks gestation of pregnancy Z3A.01   • Abdominal pain affecting pregnancy O26.899, R10.9       Past Medical History:        Diagnosis Date   • PUD (peptic ulcer disease)     states intestinal ulcer, not stomach       Past Surgical History:        Procedure Laterality Date   • APPENDECTOMY     • COLONOSCOPY N/A 1/10/2018

## 2024-12-02 NOTE — OP NOTE
[facility]  [address] [place] [address]  [phone]      REPORT OF OPERATION - Chalazion      Patient: Juliana Hancock  :     2000  MRN:     [id number]    Date:      2024      The patient was taken to the operating room and correctly identified, whereupon satisfactory anesthesia was established.The patient was given general anesthesia for ease of cooperation and compliance.   Following the general anesthesia the patient was prepped and draped in the usual sterile fashion.    Attention was directed to the right, left upper and lower lids where the chalazion was noted.  Local infiltration anesthesia consisting of Lidocaine 2% with 1:100,000 epinephrine was administered to the left and right upper and lower lids.     The lids were everted with a chalazion clamp and the chalazion identified.  A #75 blade was used to make an incision through the chalazion beds.  A chalazion curette was used to curette the material from within the chalazion beds.  The areas surrounding the chalazion beds were carefully dissected.  After adequate debulking the chalazion bilaterally and multiple lids  were dressed and maxitrol ointment was applied to the eyelids.    The patient tolerated the procedure well.  There were no intraoperative complications. The patient was discharged home in good condition.    James Khan MD    2024

## 2024-12-18 ENCOUNTER — OFFICE VISIT (OUTPATIENT)
Age: 24
End: 2024-12-18
Payer: COMMERCIAL

## 2024-12-18 VITALS
TEMPERATURE: 99 F | WEIGHT: 188 LBS | DIASTOLIC BLOOD PRESSURE: 81 MMHG | BODY MASS INDEX: 30.34 KG/M2 | OXYGEN SATURATION: 99 % | HEART RATE: 98 BPM | SYSTOLIC BLOOD PRESSURE: 119 MMHG

## 2024-12-18 DIAGNOSIS — Z30.017 NEXPLANON INSERTION: Primary | ICD-10-CM

## 2024-12-18 LAB
HCG, PREGNANCY, URINE, POC: NEGATIVE
VALID INTERNAL CONTROL, POC: YES

## 2024-12-18 PROCEDURE — 81025 URINE PREGNANCY TEST: CPT

## 2024-12-18 PROCEDURE — 11981 INSERTION DRUG DLVR IMPLANT: CPT

## 2024-12-18 NOTE — PROGRESS NOTES
Nexplanon Insertion    Desires nexplanon insertion.  UPT neg  Aware to use condoms or abstain for one week.  She was examined in a sitting position with her left arm flexed.  The groove between the bicep and tricep muscle was identified and care taken to avoid this groove.  The insertion site was identified and marked below this, over the tricep muscle approximately 8 and 10 cm proximal to the medial epicondyle of the upper arm. A second ranjana was placed 3 cm below the last ranjana. She reclined on the examination table in the supine position with her left arm flexed at the elbow and externally rotated. The insertion site was again identified. The insertion site was cleansed with an alcohol wipe.   Approximately 2 ml of 1% xylocaine were injected just under the skin along the planned insertion canal. Adequate time was given to allow the lidocaine to take effect.  The insertion site was then prepped with Betadine and draped in a sterile fashion.      The NEXPLANON sterile applicator was carefully removed from its blister pack and kept sterile. I removed the needle cap. I visually verified the presence of NEXPLANON inside the needle tip. The skin at the insertion site was then stretched by my thumb and index finger. I then inserted the needle tip through the skin at the appropriate angle to the skin surface, just until the skin has been penetrated. The needle was gently inserted to its full length. The slider was then pushed down and then moved back until it stopped. I then removed the needle and palpated the implant in the appropriate location. The patient also palpated the implant in place. Both Juliana and I were able to confirm the presence of the NEXPLANON in its subdermal location by palpation.   I placed steristrips,and a small adhesive bandage over the insertion site.  Her arm was then wrapped a pressure bandage with sterile gauze.     The patient User Card and Patient Chart Label were filled in.She was given the

## 2024-12-18 NOTE — PROGRESS NOTES
Juliana Hancock is a 24 y.o. female presents for a problem visit.    Chief Complaint   Patient presents with    Contraception         Patient's last menstrual period was 11/14/2024 (exact date).    Birth Control: Nexplanon.    Last Pap: normal obtained 3 years ago per pt      1. Have you been to the ER, urgent care clinic, or hospitalized since your last visit? No    2. Have you seen or consulted any other health care providers outside of the [facility] since your last visit? Yes    Device number [id number], expiration date 09/2026    Chart reviewed for the following:   Jolie KAUR MA, have reviewed the History, Physical and updated the Allergic reactions for Juliana Hancock     TIME OUT performed immediately prior to start of procedure:   Jolie KAUR MA, have performed the following reviews on Juliana Hancock prior to the start of the procedure:            * Patient was identified by name and date of birth   * Agreement on procedure being performed was verified  * Risks and Benefits explained to the patient  * Procedure site verified and marked as necessary  * Patient was positioned for comfort  * Consent was signed and verified     Time: 3:00pm    Date of procedure: 12/18/2024    Procedure performed by:  LORENA Monahan CNM       Provider assisted by: Jolie Lebron CMA     Patient assisted by: self    How tolerated by patient: tolerated the procedure well with no complications    Post Procedural Pain Scale: 0 - No Hurt    Comments: none    Examination chaperoned by Jolie Lebron MA.

## (undated) DEVICE — SOLUTION IRRIGATION BAL SALT SOLUTION 15 ML STRL BSS

## (undated) DEVICE — TOWEL,OR,DSP,ST,BLUE,STD,4/PK,20PK/CS: Brand: MEDLINE

## (undated) DEVICE — TROCAR SITE CLOSURE DEVICE: Brand: ENDO CLOSE

## (undated) DEVICE — NEEDLE HYPO 30GA L0.5IN BGE POLYPR HUB S STL REG BVL STR

## (undated) DEVICE — Device

## (undated) DEVICE — DEVON™ KNEE AND BODY STRAP 60" X 3" (1.5 M X 7.6 CM): Brand: DEVON

## (undated) DEVICE — INSUFFLATION NEEDLE: Brand: SURGINEEDLE

## (undated) DEVICE — ENDO CARRY-ON PROCEDURE KIT INCLUDES ENZYMATIC SPONGE, GAUZE, BIOHAZARD LABEL, TRAY, LUBRICANT, DIRTY SCOPE LABEL, WATER LABEL, TRAY, DRAWSTRING PAD, AND DEFENDO 4-PIECE KIT.: Brand: ENDO CARRY-ON PROCEDURE KIT

## (undated) DEVICE — 1200 GUARD II KIT W/5MM TUBE W/O VAC TUBE: Brand: GUARDIAN

## (undated) DEVICE — FILTER SMK EVAC FLO CLR MEGADYNE

## (undated) DEVICE — SYRINGE MED 20ML STD CLR PLAS LUERLOCK TIP N CTRL DISP

## (undated) DEVICE — AIRLIFE™ U/CONNECT-IT OXYGEN TUBING 7 FEET (2.1 M) CRUSH-RESISTANT OXYGEN TUBING, VINYL TIPPED: Brand: AIRLIFE™

## (undated) DEVICE — RELOAD STPL L45MM VASCULAR/MEDIUM TISS TAN CRV TIP ARTC

## (undated) DEVICE — BAG BELONG PT PERS CLEAR HANDL

## (undated) DEVICE — TRAY CATH 16F DRN BG LTX -- CONVERT TO ITEM 363158

## (undated) DEVICE — CONNECTOR TBNG AUX H2O JET DISP FOR OLY 160/180 SER

## (undated) DEVICE — INFECTION CONTROL KIT SYS

## (undated) DEVICE — BLADELESS OPTICAL TROCAR WITH FIXATION CANNULA: Brand: VERSAONE

## (undated) DEVICE — BAG SPEC BIOHZD LF 2MIL 6X10IN -- CONVERT TO ITEM 357326

## (undated) DEVICE — UNIVERSAL STAPLER: Brand: ENDO GIA ULTRA

## (undated) DEVICE — ENDOLOOP SUT LIGATURE 18IN -- 12/BX PDS II

## (undated) DEVICE — BLADELESS OPTICAL TROCAR WITH FIXATION CANNULA: Brand: VERSAPORT

## (undated) DEVICE — CONTAINER SPEC 20 ML LID NEUT BUFF FORMALIN 10 % POLYPR STS

## (undated) DEVICE — 3000CC GUARDIAN II: Brand: GUARDIAN

## (undated) DEVICE — SPECIMEN RETRIEVAL POUCH: Brand: ENDO CATCH GOLD

## (undated) DEVICE — KENDALL SCD EXPRESS SLEEVES, KNEE LENGTH, MEDIUM: Brand: KENDALL SCD

## (undated) DEVICE — STERILE POLYISOPRENE POWDER-FREE SURGICAL GLOVES: Brand: PROTEXIS

## (undated) DEVICE — 3M™ STERI-DRAPE™ MEDIUM DRAPE WITH WIDE ADHESIVE APERTURE 1033: Brand: STERI-DRAPE™

## (undated) DEVICE — UNIVERSAL FIXATION CANNULA: Brand: VERSAONE

## (undated) DEVICE — TUBING INSUFLTN 10FT LUER -- CONVERT TO ITEM 368568

## (undated) DEVICE — FORCEPS BX L240CM JAW DIA2.8MM L CAP W/ NDL MIC MESH TOOTH

## (undated) DEVICE — Z DISCONTINUED NO SUB IDED SET EXTN W/ 4 W STPCOCK M SPIN LOK 36IN

## (undated) DEVICE — KENDALL RADIOLUCENT FOAM MONITORING ELECTRODE -RECTANGULAR SHAPE: Brand: KENDALL

## (undated) DEVICE — STERILE POLYISOPRENE POWDER-FREE SURGICAL GLOVES WITH EMOLLIENT COATING: Brand: PROTEXIS

## (undated) DEVICE — APPLIER LIG CLP 5MM CONTAIN 16 TI CLP DISP ENDO CLP

## (undated) DEVICE — SHEARS ENDOSCP L36CM DIA5MM ULTRASONIC CRV TIP W/ ADV

## (undated) DEVICE — SOLUTION IRRIG 500ML STRL H2O NONPYROGENIC

## (undated) DEVICE — SUTURE SZ 0 27IN 5/8 CIR UR-6  TAPER PT VIOLET ABSRB VICRYL J603H

## (undated) DEVICE — CLICKLINE SCISSORS INSERT: Brand: CLICKLINE

## (undated) DEVICE — AGENT HEMSTAT W2XL14IN OXIDIZED REGENERATED CELOS ABSRB FOR

## (undated) DEVICE — SYRINGE MEDICAL 3ML CLEAR PLASTIC STANDARD NON CONTROL LUERLOCK TIP DISPOSABLE

## (undated) DEVICE — RELOAD STPL 45MM THCK TISS GRN W/ GRIPPING SURF TECHNOLOGY

## (undated) DEVICE — (D)PREP SKN CHLRAPRP APPL 26ML -- CONVERT TO ITEM 371833

## (undated) DEVICE — CATH IV AUTOGRD BC BLU 22GA 25 -- INSYTE

## (undated) DEVICE — I-KNIFE CUTTING INSTRUMENT 15 DEGREE: Brand: I-KNIFE

## (undated) DEVICE — NEEDLE HYPO 22GA L1.5IN BLK S STL HUB POLYPR SHLD REG BVL

## (undated) DEVICE — REM POLYHESIVE ADULT PATIENT RETURN ELECTRODE: Brand: VALLEYLAB

## (undated) DEVICE — SET ADMIN 16ML TBNG L100IN 2 Y INJ SITE IV PIGGY BK DISP

## (undated) DEVICE — KIT IV STRT W CHLORAPREP PD 1ML

## (undated) DEVICE — SOLIDIFIER FLUID 3000 CC ABSORB

## (undated) DEVICE — QUILTED PREMIUM COMFORT UNDERPAD,EXTRA HEAVY: Brand: WINGS

## (undated) DEVICE — E-Z CLEAN, PTFE COATED, ELECTROSURGICAL LAPAROSCOPIC ELECTRODE, L-HOOK, 33 CM., SINGLE-USE, FOR USE WITH HAND CONTROL PENCIL: Brand: MEGADYNE

## (undated) DEVICE — NEEDLE HYPO 18GA L1.5IN PNK S STL HUB POLYPR SHLD REG BVL

## (undated) DEVICE — SURGICAL PROCEDURE KIT GEN LAPAROSCOPY LF

## (undated) DEVICE — BW-412T DISP COMBO CLEANING BRUSH: Brand: SINGLE USE COMBINATION CLEANING BRUSH

## (undated) DEVICE — SUTURE MCRYL SZ 4-0 L27IN ABSRB UD L19MM PS-2 1/2 CIR PRIM Y426H

## (undated) DEVICE — Z DISCONTINUED USE 2751540 TUBING IRRIG L10IN DISP PMP ENDOGATOR

## (undated) DEVICE — APPLICATOR BNDG 1MM ADH PREMIERPRO EXOFIN

## (undated) DEVICE — SURGICAL PROCEDURE PACK CATRCT CUST